# Patient Record
Sex: FEMALE | Race: WHITE | NOT HISPANIC OR LATINO | Employment: UNEMPLOYED | URBAN - METROPOLITAN AREA
[De-identification: names, ages, dates, MRNs, and addresses within clinical notes are randomized per-mention and may not be internally consistent; named-entity substitution may affect disease eponyms.]

---

## 2017-03-07 ENCOUNTER — HISTORICAL (OUTPATIENT)
Dept: ENDOSCOPY | Facility: HOSPITAL | Age: 47
End: 2017-03-07

## 2017-03-07 LAB — CRC RECOMMENDATION EXT: NORMAL

## 2018-01-04 ENCOUNTER — HISTORICAL (OUTPATIENT)
Dept: ADMINISTRATIVE | Facility: HOSPITAL | Age: 48
End: 2018-01-04

## 2018-01-04 LAB
ALBUMIN SERPL-MCNC: 4.2 GM/DL (ref 3.4–5)
ALBUMIN/GLOB SERPL: 1 RATIO (ref 1–2)
ALP SERPL-CCNC: 70 UNIT/L (ref 45–117)
ALT SERPL-CCNC: 26 UNIT/L (ref 12–78)
AST SERPL-CCNC: 17 UNIT/L (ref 15–37)
BILIRUB SERPL-MCNC: 0.4 MG/DL (ref 0.2–1)
BILIRUBIN DIRECT+TOT PNL SERPL-MCNC: <0.1 MG/DL
BILIRUBIN DIRECT+TOT PNL SERPL-MCNC: ABNORMAL MG/DL
BUN SERPL-MCNC: 8 MG/DL (ref 7–18)
CALCIUM SERPL-MCNC: 9.7 MG/DL (ref 8.5–10.1)
CHLORIDE SERPL-SCNC: 104 MMOL/L (ref 98–107)
CO2 SERPL-SCNC: 30 MMOL/L (ref 21–32)
CREAT SERPL-MCNC: 0.7 MG/DL (ref 0.6–1.3)
ERYTHROCYTE [DISTWIDTH] IN BLOOD BY AUTOMATED COUNT: 12.9 % (ref 11.5–14.5)
GLOBULIN SER-MCNC: 3.7 GM/ML (ref 2.3–3.5)
GLUCOSE SERPL-MCNC: 61 MG/DL (ref 74–106)
HCT VFR BLD AUTO: 44.6 % (ref 35–46)
HGB BLD-MCNC: 15 GM/DL (ref 12–16)
MCH RBC QN AUTO: 31.4 PG (ref 26–34)
MCHC RBC AUTO-ENTMCNC: 33.6 GM/DL (ref 31–37)
MCV RBC AUTO: 93.5 FL (ref 80–100)
PLATELET # BLD AUTO: 434 X10(3)/MCL (ref 130–400)
PMV BLD AUTO: 9.6 FL (ref 7.4–10.4)
POTASSIUM SERPL-SCNC: 3.9 MMOL/L (ref 3.5–5.1)
PROT SERPL-MCNC: 7.9 GM/DL (ref 6.4–8.2)
RBC # BLD AUTO: 4.77 X10(6)/MCL (ref 4–5.2)
SODIUM SERPL-SCNC: 142 MMOL/L (ref 136–145)
WBC # SPEC AUTO: 8.3 X10(3)/MCL (ref 4.5–11)

## 2018-01-25 ENCOUNTER — HISTORICAL (OUTPATIENT)
Dept: SURGERY | Facility: HOSPITAL | Age: 48
End: 2018-01-25

## 2018-08-17 ENCOUNTER — HISTORICAL (OUTPATIENT)
Dept: RADIOLOGY | Facility: HOSPITAL | Age: 48
End: 2018-08-17

## 2019-06-28 ENCOUNTER — HISTORICAL (OUTPATIENT)
Dept: ADMINISTRATIVE | Facility: HOSPITAL | Age: 49
End: 2019-06-28

## 2019-08-01 ENCOUNTER — HISTORICAL (OUTPATIENT)
Dept: ADMINISTRATIVE | Facility: HOSPITAL | Age: 49
End: 2019-08-01

## 2019-08-29 ENCOUNTER — HISTORICAL (OUTPATIENT)
Dept: ADMINISTRATIVE | Facility: HOSPITAL | Age: 49
End: 2019-08-29

## 2019-09-12 ENCOUNTER — HISTORICAL (OUTPATIENT)
Dept: ADMINISTRATIVE | Facility: HOSPITAL | Age: 49
End: 2019-09-12

## 2019-10-24 ENCOUNTER — HISTORICAL (OUTPATIENT)
Dept: ADMINISTRATIVE | Facility: HOSPITAL | Age: 49
End: 2019-10-24

## 2020-01-13 PROBLEM — M79.604 LOW BACK PAIN RADIATING TO RIGHT LEG: Status: ACTIVE | Noted: 2020-01-13

## 2020-01-13 PROBLEM — M51.369 DEGENERATIVE DISC DISEASE, LUMBAR: Status: ACTIVE | Noted: 2020-01-13

## 2020-01-13 PROBLEM — M50.30 DEGENERATIVE DISC DISEASE, CERVICAL: Status: ACTIVE | Noted: 2020-01-13

## 2020-01-13 PROBLEM — R20.2 NUMBNESS AND TINGLING: Status: ACTIVE | Noted: 2020-01-13

## 2020-01-13 PROBLEM — M51.36 DEGENERATIVE DISC DISEASE, LUMBAR: Status: ACTIVE | Noted: 2020-01-13

## 2020-01-13 PROBLEM — M54.50 LOW BACK PAIN RADIATING TO RIGHT LEG: Status: ACTIVE | Noted: 2020-01-13

## 2020-01-13 PROBLEM — M79.601 BILATERAL ARM PAIN: Status: ACTIVE | Noted: 2020-01-13

## 2020-01-13 PROBLEM — M54.2 NECK PAIN: Status: ACTIVE | Noted: 2020-01-13

## 2020-01-13 PROBLEM — M79.602 BILATERAL ARM PAIN: Status: ACTIVE | Noted: 2020-01-13

## 2020-01-13 PROBLEM — R20.0 NUMBNESS AND TINGLING: Status: ACTIVE | Noted: 2020-01-13

## 2020-02-14 ENCOUNTER — HISTORICAL (OUTPATIENT)
Dept: ADMINISTRATIVE | Facility: HOSPITAL | Age: 50
End: 2020-02-14

## 2020-04-30 LAB
BILIRUB SERPL-MCNC: NEGATIVE MG/DL
BLOOD URINE, POC: NORMAL
CLARITY, POC UA: CLEAR
COLOR, POC UA: NORMAL
GLUCOSE UR QL STRIP: NEGATIVE
KETONES UR QL STRIP: NEGATIVE
LEUKOCYTE EST, POC UA: NORMAL
NITRITE, POC UA: NEGATIVE
PH, POC UA: 7
PROTEIN, POC: NEGATIVE
SPECIFIC GRAVITY, POC UA: 1.01
UROBILINOGEN, POC UA: NORMAL

## 2020-07-28 LAB
HUMAN PAPILLOMAVIRUS (HPV): NORMAL
PAP RECOMMENDATION EXT: NORMAL
PAP SMEAR: NORMAL

## 2020-11-13 ENCOUNTER — HISTORICAL (OUTPATIENT)
Dept: ADMINISTRATIVE | Facility: HOSPITAL | Age: 50
End: 2020-11-13

## 2021-02-08 LAB
CHOLEST SERPL-MCNC: 211 MG/DL (ref 0–200)
DEPRECATED CALCIDIOL+CALCIFEROL SERPL-MC: 83.1 NG/ML (ref 30–100)
EST. AVERAGE GLUCOSE BLD GHB EST-MCNC: 99 MG/DL (ref 70–115)
HBA1C MFR BLD: 5.3 % (ref 4–6)
HDLC SERPL-MCNC: 44 MG/DL (ref 40–60)
LDLC SERPL CALC-MCNC: 124.6 MG/DL (ref 30–100)
TRIGL SERPL-MCNC: 276 MG/DL (ref 30–200)
TSH SERPL-ACNC: 4.36 UIU/ML (ref 0.36–3.74)

## 2021-03-12 ENCOUNTER — HISTORICAL (OUTPATIENT)
Dept: ADMINISTRATIVE | Facility: HOSPITAL | Age: 51
End: 2021-03-12

## 2021-03-16 ENCOUNTER — HISTORICAL (OUTPATIENT)
Dept: ADMINISTRATIVE | Facility: HOSPITAL | Age: 51
End: 2021-03-16

## 2021-03-23 ENCOUNTER — HISTORICAL (OUTPATIENT)
Dept: ADMINISTRATIVE | Facility: HOSPITAL | Age: 51
End: 2021-03-23

## 2021-04-15 ENCOUNTER — HISTORICAL (OUTPATIENT)
Dept: RADIOLOGY | Facility: HOSPITAL | Age: 51
End: 2021-04-15

## 2021-05-07 ENCOUNTER — HISTORICAL (OUTPATIENT)
Dept: ADMINISTRATIVE | Facility: HOSPITAL | Age: 51
End: 2021-05-07

## 2021-05-08 ENCOUNTER — HISTORICAL (OUTPATIENT)
Dept: ADMINISTRATIVE | Facility: HOSPITAL | Age: 51
End: 2021-05-08

## 2021-05-09 LAB
ALBUMIN SERPL-MCNC: 3.6 G/DL (ref 3.4–5)
ALBUMIN/GLOB SERPL: 1.6 {RATIO}
ALP SERPL-CCNC: 65 U/L (ref 50–144)
ALT SERPL-CCNC: 15 U/L (ref 1–45)
ANION GAP SERPL CALC-SCNC: 5 MMOL/L (ref 7–16)
AST SERPL-CCNC: 21 U/L (ref 14–36)
BILIRUB SERPL-MCNC: 0.37 MG/DL (ref 0.1–1)
BUN SERPL-MCNC: 9 MG/DL (ref 7–20)
CALCIUM SERPL-MCNC: 9.5 MG/DL (ref 8.4–10.2)
CHLORIDE SERPL-SCNC: 108 MMOL/L (ref 94–110)
CO2 SERPL-SCNC: 31 MMOL/L (ref 21–32)
CREAT SERPL-MCNC: 0.88 MG/DL (ref 0.52–1.04)
CREAT/UREA NIT SERPL: 10.2 (ref 12–20)
GLOBULIN SER-MCNC: 2.3 G/DL (ref 2–3.9)
GLUCOSE SERPL-MCNC: 89 MGM./DL (ref 70–115)
POTASSIUM SERPL-SCNC: 4.1 MMOL/L (ref 3.5–5.1)
PROT SERPL-MCNC: 5.9 G/DL (ref 6.3–8.2)
SODIUM SERPL-SCNC: 144 MMOL/L (ref 135–145)

## 2021-05-17 LAB
ALBUMIN SERPL-MCNC: 4 G/DL (ref 3.4–5)
ALBUMIN/GLOB SERPL: 1.7 {RATIO}
ALP SERPL-CCNC: 79 U/L (ref 50–144)
ALT SERPL-CCNC: 27 U/L (ref 1–45)
ANION GAP SERPL CALC-SCNC: 6 MMOL/L (ref 7–16)
AST SERPL-CCNC: 25 U/L (ref 14–36)
BILIRUB SERPL-MCNC: 0.45 MG/DL (ref 0.1–1)
BUN SERPL-MCNC: 8 MG/DL (ref 7–20)
CALCIUM SERPL-MCNC: 9.7 MG/DL (ref 8.4–10.2)
CHLORIDE SERPL-SCNC: 107 MMOL/L (ref 94–110)
CHOLEST SERPL-MCNC: 141 MG/DL (ref 0–200)
CO2 SERPL-SCNC: 27 MMOL/L (ref 21–32)
CREAT SERPL-MCNC: 0.8 MG/DL (ref 0.52–1.04)
CREAT/UREA NIT SERPL: 10 (ref 12–20)
GLOBULIN SER-MCNC: 2.3 G/DL (ref 2–3.9)
GLUCOSE SERPL-MCNC: 157 MGM./DL (ref 70–115)
HDLC SERPL-MCNC: 37 MG/DL (ref 40–60)
LDLC SERPL CALC-MCNC: 73.3 MG/DL (ref 30–100)
POTASSIUM SERPL-SCNC: 3.9 MMOL/L (ref 3.5–5.1)
PROT SERPL-MCNC: 6.3 G/DL (ref 6.3–8.2)
SODIUM SERPL-SCNC: 140 MMOL/L (ref 135–145)
TRIGL SERPL-MCNC: 120 MG/DL (ref 30–200)

## 2021-05-24 LAB
BASOPHILS # BLD AUTO: 0.04 X10(3)/MCL (ref 0.01–0.08)
BASOPHILS NFR BLD AUTO: 0.5 % (ref 0.1–1.2)
BILIRUB SERPL-MCNC: NEGATIVE MG/DL
BLOOD URINE, POC: NORMAL
CLARITY, POC UA: NORMAL
COLOR, POC UA: YELLOW
EOSINOPHIL # BLD AUTO: 0.1 X10(3)/MCL (ref 0.04–0.36)
EOSINOPHIL NFR BLD AUTO: 1.1 % (ref 0.7–7)
ERYTHROCYTE [DISTWIDTH] IN BLOOD BY AUTOMATED COUNT: 13.2 % (ref 11–14.5)
GLUCOSE UR QL STRIP: NEGATIVE
HCT VFR BLD AUTO: 40.7 % (ref 36–48)
HGB BLD-MCNC: 13.2 G/DL (ref 11.8–16)
IMM GRANULOCYTES # BLD AUTO: 0.02 X10E3/UL (ref 0–0.03)
IMM GRANULOCYTES NFR BLD AUTO: 0.2 % (ref 0–0.5)
KETONES UR QL STRIP: NEGATIVE
LEUKOCYTE EST, POC UA: NORMAL
LYMPHOCYTES # BLD AUTO: 3.17 X10(3)/MCL (ref 1.16–3.74)
LYMPHOCYTES NFR BLD AUTO: 36 % (ref 20–55)
MCH RBC QN AUTO: 30.6 PG (ref 27–34)
MCHC RBC AUTO-ENTMCNC: 32.4 G/DL (ref 31–37)
MCV RBC AUTO: 94.2 FL (ref 79–99)
MONOCYTES # BLD AUTO: 0.61 X10(3)/MCL (ref 0.24–0.36)
MONOCYTES NFR BLD AUTO: 6.9 % (ref 4.7–12.5)
NEUTROPHILS # BLD AUTO: 4.86 X10(3)/MCL (ref 1.56–6.13)
NEUTROPHILS NFR BLD AUTO: 55.3 % (ref 37–73)
NITRITE, POC UA: NEGATIVE
PH, POC UA: 7
PLATELET # BLD AUTO: 421 X10(3)/MCL (ref 140–371)
PMV BLD AUTO: 9.9 FL (ref 9.4–12.4)
PROTEIN, POC: NEGATIVE
RBC # BLD AUTO: 4.32 X10(6)/MCL (ref 4–5.1)
SPECIFIC GRAVITY, POC UA: 1.02
T3FREE SERPL-MCNC: 2.8 PG/ML (ref 2.7–5.2)
T4 FREE SERPL-MCNC: 1.36 NG/DL (ref 0.78–2.19)
TSH SERPL-ACNC: 2.31 UIU/ML (ref 0.36–3.74)
UROBILINOGEN, POC UA: NORMAL
WBC # SPEC AUTO: 8.8 X10(3)/MCL (ref 4–11.5)

## 2021-05-27 ENCOUNTER — HISTORICAL (OUTPATIENT)
Dept: ADMINISTRATIVE | Facility: HOSPITAL | Age: 51
End: 2021-05-27

## 2021-07-07 ENCOUNTER — HISTORICAL (OUTPATIENT)
Dept: ADMINISTRATIVE | Facility: HOSPITAL | Age: 51
End: 2021-07-07

## 2021-09-10 LAB
BASOPHILS # BLD AUTO: 0.07 X10(3)/MCL (ref 0.01–0.08)
BASOPHILS NFR BLD AUTO: 0.9 % (ref 0.1–1.2)
EOSINOPHIL # BLD AUTO: 0.12 X10(3)/MCL (ref 0.04–0.36)
EOSINOPHIL NFR BLD AUTO: 1.5 % (ref 0.7–7)
ERYTHROCYTE [DISTWIDTH] IN BLOOD BY AUTOMATED COUNT: 13.3 % (ref 11–14.5)
HCT VFR BLD AUTO: 39.3 % (ref 36–48)
HGB BLD-MCNC: 13.1 G/DL (ref 11.8–16)
IMM GRANULOCYTES # BLD AUTO: 0.04 X10E3/UL (ref 0–0.03)
IMM GRANULOCYTES NFR BLD AUTO: 0.5 % (ref 0–0.5)
LYMPHOCYTES # BLD AUTO: 2.59 X10(3)/MCL (ref 1.16–3.74)
LYMPHOCYTES NFR BLD AUTO: 33 % (ref 20–55)
MCH RBC QN AUTO: 30.5 PG (ref 27–34)
MCHC RBC AUTO-ENTMCNC: 33.3 G/DL (ref 31–37)
MCV RBC AUTO: 91.4 FL (ref 79–99)
MONOCYTES # BLD AUTO: 0.54 X10(3)/MCL (ref 0.24–0.36)
MONOCYTES NFR BLD AUTO: 6.9 % (ref 4.7–12.5)
NEUTROPHILS # BLD AUTO: 4.49 X10(3)/MCL (ref 1.56–6.13)
NEUTROPHILS NFR BLD AUTO: 57.2 % (ref 37–73)
PLATELET # BLD AUTO: 358 X10(3)/MCL (ref 140–371)
PMV BLD AUTO: 9.3 FL (ref 9.4–12.4)
RBC # BLD AUTO: 4.3 X10(6)/MCL (ref 4–5.1)
WBC # SPEC AUTO: 7.9 X10(3)/MCL (ref 4–11.5)

## 2022-01-04 LAB
INFLUENZA A ANTIGEN, POC: NEGATIVE
INFLUENZA B ANTIGEN, POC: NEGATIVE

## 2022-04-11 ENCOUNTER — HISTORICAL (OUTPATIENT)
Dept: ADMINISTRATIVE | Facility: HOSPITAL | Age: 52
End: 2022-04-11

## 2022-04-28 VITALS
SYSTOLIC BLOOD PRESSURE: 100 MMHG | HEIGHT: 64 IN | OXYGEN SATURATION: 95 % | DIASTOLIC BLOOD PRESSURE: 72 MMHG | BODY MASS INDEX: 50.02 KG/M2 | WEIGHT: 293 LBS

## 2022-04-30 NOTE — H&P
H&P:  Pt presents for removal of a epidermal inclusion cyst of the L brow with pathological analysis. H&P not significantly changed since the last visit as indiciated below.     Review of Systems   Constitutional symptoms:  No fever, no chills.    Skin symptoms:  No rash, no pruritus, no lesion.    Eye symptoms:  No recent vision problems,    ENMT symptoms:  Ear pain, tooth pain, no sore throat, no nasal congestion.    Respiratory symptoms:  No shortness of breath, no cough.    Cardiovascular symptoms:  No chest pain, no peripheral edema.    Gastrointestinal symptoms:  No abdominal pain, no nausea, no vomiting, no diarrhea.    Genitourinary symptoms:  No dysuria, no hematuria.    Musculoskeletal symptoms:  No Joint pain,    Neurologic symptoms:  No headache, no dizziness.    Psychiatric symptoms:  No anxiety,    Hematologic/Lymphatic symptoms:  Bleeding tendency negative,              Additional review of systems information: All other systems reviewed and otherwise negative, All systems reviewed as documented in chart.        Physicial Exam:   General:  Alert, no acute distress.    Skin:  Warm, dry, pink.    Head:  Normocephalic, atraumatic.    Neck:  Supple, trachea midline, no tenderness, no JVD.    Eye:  Pupils are equal, round and reactive to light, extraocular movements are intact, normal conjunctiva, vision unchanged, Eyelids: Left, upper, cyst.    Ears, nose, mouth and throat:  Oral mucosa moist, no pharyngeal erythema or exudate, Tympanic membrane: Bilateral, EVANGELINA, no erythema or drainage, Tooth: Left, upper, lower, molar, dental caries, fracture, no drainage visualized.    Cardiovascular:  Regular rate and rhythm, No murmur, Normal peripheral perfusion, No edema.    Respiratory:  Lungs are clear to auscultation, respirations are non-labored, breath sounds are equal, Symmetrical chest wall expansion.    Gastrointestinal:  Soft, Nontender, Non distended, Normal bowel sounds, No organomegaly.    Back:   Nontender, Normal range of motion, Normal alignment.    Musculoskeletal:  Normal ROM, normal strength, no tenderness, no swelling.    Neurological:  Alert and oriented to person, place, time, and situation, No focal neurological deficit observed, CN II-XII intact, normal sensory observed, normal motor observed, normal speech observed, normal coordination observed.    Lymphatics:  No lymphadenopathy.   Psychiatric:  Cooperative, appropriate mood & affect, normal judgment.        VAsc:   20/40 PH 20/30   20/50 PH 20/40    CVFF  EOMI    SLE  ELL: 1.7x1.0cm epidermal inclusion cyst OS sup-jeana orbit  CS: wq OU  K: clear OU  AC: dq OU  I: rr OU  L: clear OU  V; clear OU    A/P    1. Large epidermal inclusion cyst OS (sup-jeana orbit rim abutting nose)  - Pt wants it removed, it is enlarging, started with a small one which she popped but came back and cont to grow  - Discussed removal, pt agreeable to remove  - R/B/A/C discussed with patient including but not exclusive of possiblity that it may not be an inclusion cyst  - Will remove in OR and send for pathology    RTC POW#1

## 2022-04-30 NOTE — OP NOTE
Patient:   Samanta Chambers            MRN: [id number]            FIN: [id number]               Age:   47 years     Sex:  Female     :  1970   Associated Diagnoses:   None   Author:   Jesse Brunson MD         Patient: Samnata Chambers    Attending Surgeon: MD Mark    Assistant: CINDY Brunson MD    Pre-operative Diagnosis: L medial canthal / brow epidermal cyst    Post-operative Diagnosis: L medial canthal / brow epidermal cyst    Anesthesia: General    Procedure: Cyst removal.    Indication for Surgery: The patient was seen in the ophthalmology clinic complaining of increased mass above L eye with desire for removal for cosmetic / diagnostic purposes. Pt was informed about risks and benefits to procedure and all possible alternatives for therapy.     Proceure: The patient was taken into the operating room, placed in the supine position. General anesthetic was administered. The patient was prepped and draped in a usual manner. The procedure began by infiltrating lidocaine with epinephrine around the cyst area. Then, I proceeded with the help of a 15C blade to make an incision direclty over the mass. The incision was done superiorly then inferiorly to a full thickness and to the skin down to the cyst. The cyst was detached of the surrounding structure with the help of blunt dissection. Hemostasis was achieved with electrocautery. The mass was removed without rupture of the cyst capsule. The subcutanous tissues were closed with one 6-0 plain gut suture and then the wound was closed with 5 6-0 interupted nylon sutures. The wound was cleaned and maxitriol ointment was then put on the wound. The patient tolerated the procedure well without complications and transferred to recovery room in stable condition. I was present and participated in all aspects of the procedure. Sponge, needle, and instrument counts were completed at the end of the procedure.     The patient was then awoken from anesthesia  and wheeled to the PACU in stable condition.    Complications: None    Specimens: Sebcecous Cyst L brow / medial canthus    Condition: Stable    Dispotion: PACU then home

## 2022-05-08 ENCOUNTER — HISTORICAL (OUTPATIENT)
Dept: ADMINISTRATIVE | Facility: HOSPITAL | Age: 52
End: 2022-05-08

## 2022-05-29 ENCOUNTER — HISTORICAL (OUTPATIENT)
Dept: ADMINISTRATIVE | Facility: HOSPITAL | Age: 52
End: 2022-05-29

## 2022-09-21 ENCOUNTER — HISTORICAL (OUTPATIENT)
Dept: ADMINISTRATIVE | Facility: HOSPITAL | Age: 52
End: 2022-09-21
Payer: MEDICAID

## 2022-09-22 ENCOUNTER — HISTORICAL (OUTPATIENT)
Dept: ADMINISTRATIVE | Facility: HOSPITAL | Age: 52
End: 2022-09-22
Payer: MEDICAID

## 2023-01-23 RX ORDER — CITALOPRAM 40 MG/1
40 TABLET, FILM COATED ORAL DAILY
Qty: 30 TABLET | Refills: 0 | Status: SHIPPED | OUTPATIENT
Start: 2023-01-23 | End: 2023-01-24 | Stop reason: SDUPTHER

## 2023-01-23 RX ORDER — ATORVASTATIN CALCIUM 20 MG/1
20 TABLET, FILM COATED ORAL DAILY
COMMUNITY
End: 2023-01-23 | Stop reason: SDUPTHER

## 2023-01-23 RX ORDER — ATORVASTATIN CALCIUM 20 MG/1
20 TABLET, FILM COATED ORAL DAILY
Qty: 30 TABLET | Refills: 0 | Status: SHIPPED | OUTPATIENT
Start: 2023-01-23 | End: 2023-01-24 | Stop reason: SDUPTHER

## 2023-01-23 NOTE — TELEPHONE ENCOUNTER
----- Message from Moshe Thomas sent at 1/23/2023  9:10 AM CST -----  Regarding: REFILL  Type:  RX Refill Request    Who Called: pt  Refill or New Rx:refill  RX Name and Strength:citalopram (CELEXA) 40 MG tablet  How is the patient currently taking it? (ex. 1XDay):  Is this a 30 day or 90 day RX:  RX Name and Strength: atorvastatin 20mg  How is the patient currently taking it? (ex. 1XDay):  Is this a 30 day or 90 day RX:  RX Name and Strength:mirtazapine 15 mg  How is the patient currently taking it? (ex. 1XDay):  Is this a 30 day or 90 day RX:    Preferred Pharm[facility] with phone number[facility]   Local or Mail Order:  Ordering Provider:  Would the patient rather a call back or a response via [facility]?   Best Call Back Number:[phone]  Additional Information: pt need meds before next appt.

## 2023-01-24 NOTE — TELEPHONE ENCOUNTER
----- Message from Christie Pryor sent at 1/24/2023 11:44 AM CST -----  Regarding: refills      atorvastatin 20 mg oral tablet        citalopram 40 mg oral tablet          Wilfredo        [phone]

## 2023-01-25 RX ORDER — CITALOPRAM 40 MG/1
40 TABLET, FILM COATED ORAL DAILY
Qty: 30 TABLET | Refills: 0 | Status: SHIPPED | OUTPATIENT
Start: 2023-01-25 | End: 2023-02-03 | Stop reason: ALTCHOICE

## 2023-01-25 RX ORDER — ATORVASTATIN CALCIUM 20 MG/1
20 TABLET, FILM COATED ORAL DAILY
Qty: 30 TABLET | Refills: 0 | Status: SHIPPED | OUTPATIENT
Start: 2023-01-25 | End: 2023-02-09 | Stop reason: SDUPTHER

## 2023-01-27 ENCOUNTER — DOCUMENTATION ONLY (OUTPATIENT)
Dept: ADMINISTRATIVE | Facility: HOSPITAL | Age: 53
End: 2023-01-27
Payer: MEDICAID

## 2023-02-03 VITALS
BODY MASS INDEX: 68.21 KG/M2 | OXYGEN SATURATION: 98 % | DIASTOLIC BLOOD PRESSURE: 64 MMHG | SYSTOLIC BLOOD PRESSURE: 100 MMHG | HEART RATE: 100 BPM | HEIGHT: 64 IN | TEMPERATURE: 99 F

## 2023-02-03 RX ORDER — AMITRIPTYLINE HYDROCHLORIDE 25 MG/1
25 TABLET, FILM COATED ORAL NIGHTLY
COMMUNITY
Start: 2022-10-17 | End: 2023-02-06

## 2023-02-03 RX ORDER — MIRTAZAPINE 15 MG/1
15 TABLET, FILM COATED ORAL
COMMUNITY
Start: 2022-12-18 | End: 2023-02-27 | Stop reason: SDUPTHER

## 2023-02-03 RX ORDER — ALBUTEROL SULFATE 90 UG/1
AEROSOL, METERED RESPIRATORY (INHALATION)
COMMUNITY
Start: 2021-12-09 | End: 2023-02-06 | Stop reason: SDUPTHER

## 2023-02-03 RX ORDER — ERGOCALCIFEROL 1.25 MG/1
50000 CAPSULE ORAL
COMMUNITY
Start: 2023-01-17 | End: 2023-03-20 | Stop reason: SDUPTHER

## 2023-02-03 RX ORDER — DULOXETIN HYDROCHLORIDE 60 MG/1
60 CAPSULE, DELAYED RELEASE ORAL
COMMUNITY
Start: 2022-10-17 | End: 2023-03-27 | Stop reason: ALTCHOICE

## 2023-02-03 RX ORDER — CHOLECALCIFEROL (VITAMIN D3) 1250 MCG
TABLET ORAL
COMMUNITY
Start: 2021-12-15 | End: 2023-05-26 | Stop reason: SDUPTHER

## 2023-02-03 RX ORDER — OMEPRAZOLE 40 MG/1
40 CAPSULE, DELAYED RELEASE ORAL
COMMUNITY
Start: 2023-01-11 | End: 2023-05-01

## 2023-02-03 RX ORDER — METOPROLOL SUCCINATE 25 MG/1
25 TABLET, EXTENDED RELEASE ORAL
COMMUNITY
Start: 2023-01-23 | End: 2023-03-20

## 2023-02-03 RX ORDER — BUPROPION HYDROCHLORIDE 150 MG/1
TABLET, EXTENDED RELEASE ORAL
COMMUNITY
Start: 2022-09-01 | End: 2023-03-27 | Stop reason: SDUPTHER

## 2023-02-03 RX ORDER — TIZANIDINE 4 MG/1
4 TABLET ORAL
COMMUNITY
Start: 2023-01-11

## 2023-02-06 ENCOUNTER — OFFICE VISIT (OUTPATIENT)
Dept: FAMILY MEDICINE | Facility: CLINIC | Age: 53
End: 2023-02-06
Payer: MEDICAID

## 2023-02-06 VITALS
TEMPERATURE: 98 F | HEIGHT: 64 IN | SYSTOLIC BLOOD PRESSURE: 100 MMHG | OXYGEN SATURATION: 98 % | BODY MASS INDEX: 31.27 KG/M2 | HEART RATE: 86 BPM | WEIGHT: 183.19 LBS | DIASTOLIC BLOOD PRESSURE: 68 MMHG

## 2023-02-06 DIAGNOSIS — R51.9 CHRONIC NONINTRACTABLE HEADACHE, UNSPECIFIED HEADACHE TYPE: Primary | ICD-10-CM

## 2023-02-06 DIAGNOSIS — R06.2 WHEEZING: ICD-10-CM

## 2023-02-06 DIAGNOSIS — G89.29 CHRONIC NONINTRACTABLE HEADACHE, UNSPECIFIED HEADACHE TYPE: Primary | ICD-10-CM

## 2023-02-06 DIAGNOSIS — M53.9 MULTILEVEL DEGENERATIVE DISC DISEASE: ICD-10-CM

## 2023-02-06 PROCEDURE — 3008F PR BODY MASS INDEX (BMI) DOCUMENTED: ICD-10-PCS | Mod: CPTII,,, | Performed by: NURSE PRACTITIONER

## 2023-02-06 PROCEDURE — 1159F MED LIST DOCD IN RCRD: CPT | Mod: CPTII,,, | Performed by: NURSE PRACTITIONER

## 2023-02-06 PROCEDURE — 3008F BODY MASS INDEX DOCD: CPT | Mod: CPTII,,, | Performed by: NURSE PRACTITIONER

## 2023-02-06 PROCEDURE — 1159F PR MEDICATION LIST DOCUMENTED IN MEDICAL RECORD: ICD-10-PCS | Mod: CPTII,,, | Performed by: NURSE PRACTITIONER

## 2023-02-06 PROCEDURE — 3074F SYST BP LT 130 MM HG: CPT | Mod: CPTII,,, | Performed by: NURSE PRACTITIONER

## 2023-02-06 PROCEDURE — 99213 OFFICE O/P EST LOW 20 MIN: CPT | Mod: ,,, | Performed by: NURSE PRACTITIONER

## 2023-02-06 PROCEDURE — 3074F PR MOST RECENT SYSTOLIC BLOOD PRESSURE < 130 MM HG: ICD-10-PCS | Mod: CPTII,,, | Performed by: NURSE PRACTITIONER

## 2023-02-06 PROCEDURE — 3078F DIAST BP <80 MM HG: CPT | Mod: CPTII,,, | Performed by: NURSE PRACTITIONER

## 2023-02-06 PROCEDURE — 99213 PR OFFICE/OUTPT VISIT, EST, LEVL III, 20-29 MIN: ICD-10-PCS | Mod: ,,, | Performed by: NURSE PRACTITIONER

## 2023-02-06 PROCEDURE — 3078F PR MOST RECENT DIASTOLIC BLOOD PRESSURE < 80 MM HG: ICD-10-PCS | Mod: CPTII,,, | Performed by: NURSE PRACTITIONER

## 2023-02-06 RX ORDER — ALBUTEROL SULFATE 90 UG/1
2 AEROSOL, METERED RESPIRATORY (INHALATION) EVERY 6 HOURS PRN
Qty: 18 G | Refills: 0 | Status: SHIPPED | OUTPATIENT
Start: 2023-02-06

## 2023-02-06 RX ORDER — SUMATRIPTAN 50 MG/1
25 TABLET, FILM COATED ORAL
Qty: 20 TABLET | Refills: 0 | Status: SHIPPED | OUTPATIENT
Start: 2023-02-06 | End: 2024-03-19

## 2023-02-06 RX ORDER — PREGABALIN 150 MG/1
150 CAPSULE ORAL 3 TIMES DAILY
COMMUNITY
End: 2024-03-19

## 2023-02-06 NOTE — PROGRESS NOTES
"SUBJECTIVE:  Samanta Pastrana is a 52 y.o. female here for Headache and Cough      HPI  Presents to the clinic with c/o headaches on and off almost daily.  Has been happening for several weeks.  She has a PMH of degenerative disc diease at cervical, lumbar and thoracic levels.  She is following with  Dr Mcneill and has had several surgeries.  She has also had some wheezing with cough on and off and is out of her albuterol.   Griseldas allergies, medications, history, and problem list were updated as appropriate.    Review of Systems   Respiratory:  Positive for wheezing.    Neurological:  Positive for headaches.    A comprehensive review of symptoms was completed and negative except as noted above.    No results found for this or any previous visit (from the past 504 hour(s)).    OBJECTIVE:  Vital signs  Vitals:    02/06/23 1521   BP: 100/68   BP Location: Left arm   Patient Position: Sitting   BP Method: Medium (Manual)   Pulse: 86   Temp: 97.7 °F (36.5 °C)   TempSrc: Oral   SpO2: 98%   Weight: 83.1 kg (183 lb 3.2 oz)   Height: 5' 4" (1.626 m)        Physical Exam  Constitutional:       Appearance: Normal appearance.   HENT:      Head: Normocephalic and atraumatic.      Right Ear: Tympanic membrane, ear canal and external ear normal.      Left Ear: Tympanic membrane, ear canal and external ear normal.      Nose: Nose normal.      Mouth/Throat:      Mouth: Mucous membranes are moist.      Pharynx: Oropharynx is clear.   Eyes:      Extraocular Movements: Extraocular movements intact.      Conjunctiva/sclera: Conjunctivae normal.      Pupils: Pupils are equal, round, and reactive to light.   Cardiovascular:      Rate and Rhythm: Normal rate and regular rhythm.   Pulmonary:      Effort: Pulmonary effort is normal.      Breath sounds: Normal breath sounds.   Abdominal:      General: Bowel sounds are normal.      Palpations: Abdomen is soft.   Musculoskeletal:      Cervical back: Neck supple. Tenderness present. Pain with " movement and muscular tenderness present. Decreased range of motion.   Skin:     General: Skin is warm.      Capillary Refill: Capillary refill takes less than 2 seconds.   Neurological:      Mental Status: She is alert.   Psychiatric:         Mood and Affect: Mood normal.         Behavior: Behavior normal.         Judgment: Judgment normal.        ASSESSMENT/PLAN:  1. Chronic nonintractable headache, unspecified headache type  Comments:  headahce diary given and discussed use  Orders:  -     sumatriptan (IMITREX) 50 MG tablet; Take 0.5 tablets (25 mg total) by mouth every 2 (two) hours as needed for Migraine. Do not take more than 200 mg per day.  Dispense: 20 tablet; Refill: 0    2. Wheezing  Comments:  on and off - none today with exam    3. Multilevel degenerative disc disease  Comments:  being followed by neurosurgery, discussed as a likely cause of her headaches        Follow Up:  No follow-ups on file.

## 2023-02-09 ENCOUNTER — TELEPHONE (OUTPATIENT)
Dept: FAMILY MEDICINE | Facility: CLINIC | Age: 53
End: 2023-02-09
Payer: MEDICAID

## 2023-02-09 DIAGNOSIS — E78.5 HYPERLIPIDEMIA, UNSPECIFIED HYPERLIPIDEMIA TYPE: Primary | ICD-10-CM

## 2023-02-09 RX ORDER — ATORVASTATIN CALCIUM 20 MG/1
20 TABLET, FILM COATED ORAL DAILY
Qty: 30 TABLET | Refills: 0 | Status: SHIPPED | OUTPATIENT
Start: 2023-02-09 | End: 2023-02-27 | Stop reason: SDUPTHER

## 2023-02-23 ENCOUNTER — TELEPHONE (OUTPATIENT)
Dept: FAMILY MEDICINE | Facility: CLINIC | Age: 53
End: 2023-02-23
Payer: MEDICAID

## 2023-02-23 DIAGNOSIS — D22.9 MULTIPLE ATYPICAL SKIN MOLES: Primary | ICD-10-CM

## 2023-02-23 NOTE — TELEPHONE ENCOUNTER
Patient called and stated that she needs a referral sent to go see Ari Reyes since she has not seen him since 2019. Please advise.

## 2023-02-27 ENCOUNTER — TELEPHONE (OUTPATIENT)
Dept: FAMILY MEDICINE | Facility: CLINIC | Age: 53
End: 2023-02-27
Payer: MEDICAID

## 2023-02-27 DIAGNOSIS — F41.9 ANXIETY: Primary | ICD-10-CM

## 2023-02-27 DIAGNOSIS — E78.5 HYPERLIPIDEMIA, UNSPECIFIED HYPERLIPIDEMIA TYPE: ICD-10-CM

## 2023-02-27 DIAGNOSIS — G47.00 INSOMNIA, UNSPECIFIED TYPE: ICD-10-CM

## 2023-02-27 DIAGNOSIS — G47.00 INSOMNIA, UNSPECIFIED TYPE: Primary | ICD-10-CM

## 2023-02-27 RX ORDER — MIRTAZAPINE 15 MG/1
15 TABLET, FILM COATED ORAL NIGHTLY
Qty: 30 TABLET | Refills: 0 | Status: SHIPPED | OUTPATIENT
Start: 2023-02-27 | End: 2023-02-27 | Stop reason: SDUPTHER

## 2023-02-27 RX ORDER — CITALOPRAM 40 MG/1
40 TABLET, FILM COATED ORAL DAILY
COMMUNITY
End: 2023-02-27 | Stop reason: SDUPTHER

## 2023-02-27 RX ORDER — CITALOPRAM 40 MG/1
40 TABLET, FILM COATED ORAL DAILY
Qty: 30 TABLET | Refills: 0 | Status: SHIPPED | OUTPATIENT
Start: 2023-02-27 | End: 2023-03-31

## 2023-02-27 RX ORDER — ATORVASTATIN CALCIUM 20 MG/1
20 TABLET, FILM COATED ORAL DAILY
Qty: 30 TABLET | Refills: 0 | Status: SHIPPED | OUTPATIENT
Start: 2023-02-27 | End: 2023-02-27 | Stop reason: SDUPTHER

## 2023-02-27 RX ORDER — CITALOPRAM 40 MG/1
40 TABLET, FILM COATED ORAL DAILY
Qty: 30 TABLET | Refills: 11 | OUTPATIENT
Start: 2023-02-27 | End: 2024-02-27

## 2023-02-27 RX ORDER — ATORVASTATIN CALCIUM 20 MG/1
20 TABLET, FILM COATED ORAL DAILY
Qty: 30 TABLET | Refills: 0 | Status: SHIPPED | OUTPATIENT
Start: 2023-02-27 | End: 2023-03-31

## 2023-02-27 RX ORDER — MIRTAZAPINE 15 MG/1
15 TABLET, FILM COATED ORAL NIGHTLY
Qty: 30 TABLET | Refills: 0 | Status: SHIPPED | OUTPATIENT
Start: 2023-02-27 | End: 2023-03-27 | Stop reason: SDUPTHER

## 2023-03-20 ENCOUNTER — OFFICE VISIT (OUTPATIENT)
Dept: FAMILY MEDICINE | Facility: CLINIC | Age: 53
End: 2023-03-20
Payer: MEDICAID

## 2023-03-20 VITALS
TEMPERATURE: 97 F | DIASTOLIC BLOOD PRESSURE: 70 MMHG | BODY MASS INDEX: 31.17 KG/M2 | HEIGHT: 64 IN | HEART RATE: 80 BPM | SYSTOLIC BLOOD PRESSURE: 110 MMHG | OXYGEN SATURATION: 99 % | WEIGHT: 182.56 LBS

## 2023-03-20 DIAGNOSIS — J01.00 ACUTE MAXILLARY SINUSITIS, RECURRENCE NOT SPECIFIED: ICD-10-CM

## 2023-03-20 DIAGNOSIS — H10.33 ACUTE BACTERIAL CONJUNCTIVITIS OF BOTH EYES: Primary | ICD-10-CM

## 2023-03-20 PROCEDURE — 3008F BODY MASS INDEX DOCD: CPT | Mod: CPTII,,, | Performed by: NURSE PRACTITIONER

## 2023-03-20 PROCEDURE — 1160F PR REVIEW ALL MEDS BY PRESCRIBER/CLIN PHARMACIST DOCUMENTED: ICD-10-PCS | Mod: CPTII,,, | Performed by: NURSE PRACTITIONER

## 2023-03-20 PROCEDURE — 3074F SYST BP LT 130 MM HG: CPT | Mod: CPTII,,, | Performed by: NURSE PRACTITIONER

## 2023-03-20 PROCEDURE — 3074F PR MOST RECENT SYSTOLIC BLOOD PRESSURE < 130 MM HG: ICD-10-PCS | Mod: CPTII,,, | Performed by: NURSE PRACTITIONER

## 2023-03-20 PROCEDURE — 99213 OFFICE O/P EST LOW 20 MIN: CPT | Mod: ,,, | Performed by: NURSE PRACTITIONER

## 2023-03-20 PROCEDURE — 3078F DIAST BP <80 MM HG: CPT | Mod: CPTII,,, | Performed by: NURSE PRACTITIONER

## 2023-03-20 PROCEDURE — 1159F MED LIST DOCD IN RCRD: CPT | Mod: CPTII,,, | Performed by: NURSE PRACTITIONER

## 2023-03-20 PROCEDURE — 1159F PR MEDICATION LIST DOCUMENTED IN MEDICAL RECORD: ICD-10-PCS | Mod: CPTII,,, | Performed by: NURSE PRACTITIONER

## 2023-03-20 PROCEDURE — 99213 PR OFFICE/OUTPT VISIT, EST, LEVL III, 20-29 MIN: ICD-10-PCS | Mod: ,,, | Performed by: NURSE PRACTITIONER

## 2023-03-20 PROCEDURE — 3008F PR BODY MASS INDEX (BMI) DOCUMENTED: ICD-10-PCS | Mod: CPTII,,, | Performed by: NURSE PRACTITIONER

## 2023-03-20 PROCEDURE — 3078F PR MOST RECENT DIASTOLIC BLOOD PRESSURE < 80 MM HG: ICD-10-PCS | Mod: CPTII,,, | Performed by: NURSE PRACTITIONER

## 2023-03-20 PROCEDURE — 1160F RVW MEDS BY RX/DR IN RCRD: CPT | Mod: CPTII,,, | Performed by: NURSE PRACTITIONER

## 2023-03-20 RX ORDER — CEFDINIR 300 MG/1
300 CAPSULE ORAL 2 TIMES DAILY
Qty: 20 CAPSULE | Refills: 0 | Status: SHIPPED | OUTPATIENT
Start: 2023-03-20 | End: 2023-03-27

## 2023-03-20 RX ORDER — MOXIFLOXACIN 5 MG/ML
1 SOLUTION/ DROPS OPHTHALMIC 3 TIMES DAILY
Qty: 3 ML | Refills: 0 | Status: SHIPPED | OUTPATIENT
Start: 2023-03-20 | End: 2023-03-27

## 2023-03-20 NOTE — PROGRESS NOTES
Patient ID: Samanta Pastrana  : 1970     Chief Complaint: Conjunctivitis    Allergies: Patient has No Known Allergies.     History of Present Illness:  The patient is a 53 y.o. White female who presents to clinic for evaluation and management with a chief complaint of Conjunctivitis   Conjunctivitis  This is a new problem. The current episode started in the past 7 days. The problem has been gradually worsening. Associated symptoms include congestion, coughing, headaches and swollen glands. Pertinent negatives include no abdominal pain, anorexia, arthralgias, change in bowel habit, chest pain, chills, diaphoresis, fever, nausea, sore throat or urinary symptoms. Treatments tried: otc allergy eye drops. The treatment provided no relief (symptoms progressing despite otc allergy drops. now having to use wet compresses to open eyelids in am).   Sinus Problem  This is a new problem. The current episode started 1 to 4 weeks ago. The problem has been gradually worsening since onset. There has been no fever. The pain is moderate. Associated symptoms include congestion, coughing, headaches, a hoarse voice, sinus pressure, sneezing and swollen glands. Pertinent negatives include no chills, diaphoresis, ear pain, shortness of breath or sore throat. Past treatments include acetaminophen, saline nose sprays, oral decongestants and nasal decongestants. The treatment provided moderate relief.      Past Medical History:  has a past medical history of Allergies, Anxiety, Arthritis, Bilateral hand pain, Cancer, Carpal tunnel syndrome, Cervical neuropathy, Chronic pain, COVID-19, DDD (degenerative disc disease), cervical, DDD (degenerative disc disease), lumbar, Depression, DUB (dysfunctional uterine bleeding), Fibromyalgia, GERD (gastroesophageal reflux disease), History of lobectomy of thyroid, Hyperlipidemia, Insomnia, Joint pain, Mental disorder, Obesity, unspecified, Renal cyst, Right ureteral stone, Tobacco user, and  Vitamin D deficiency.    Social History:  reports that she has been smoking cigarettes. She has a 7.50 pack-year smoking history. She has been exposed to tobacco smoke. She has never used smokeless tobacco. She reports that she does not currently use alcohol. She reports that she does not use drugs.    Care Team: Patient Care Team:  DAQUAN Garcia as PCP - General (Family Medicine)  Chris Vázquez MD (Neurosurgery)  Rui Ortiz MD as Consulting Physician (Neurology)     Current Medications:  Current Outpatient Medications   Medication Instructions    albuterol (PROVENTIL/VENTOLIN HFA) 90 mcg/actuation inhaler 2 puffs, Inhalation, Every 6 hours PRN    atorvastatin (LIPITOR) 20 mg, Oral, Daily    buPROPion (WELLBUTRIN SR) 150 MG TBSR 12 hr tablet No dose, route, or frequency recorded.    cholecalciferol, vitamin D3, 1,250 mcg (50,000 unit) Tab   See Instructions, TAKE 1 CAPSULE BY MOUTH EVERY WEEK, # 5 cap(s), 6 Refill(s)    citalopram (CELEXA) 40 mg, Oral, Daily    DULoxetine (CYMBALTA) 60 mg, Oral    ergocalciferol (ERGOCALCIFEROL) 50,000 Units, Oral, Every 7 days    metoprolol succinate (TOPROL-XL) 25 mg, Oral    mirtazapine (REMERON) 15 mg, Oral, Nightly    naproxen (NAPROSYN) 500 mg, Oral, 2 times daily    omeprazole (PRILOSEC) 40 mg, Oral    pregabalin (LYRICA) 150 mg, Oral, 3 times daily    sumatriptan (IMITREX) 25 mg, Oral, Every 2 hours PRN, Do not take more than 200 mg per day.    tiZANidine (ZANAFLEX) 4 mg, Oral       Review of Systems   Constitutional:  Negative for chills, diaphoresis and fever.   HENT:  Positive for nasal congestion, hoarse voice, postnasal drip, rhinorrhea, sinus pressure/congestion and sneezing. Negative for ear pain and sore throat.    Eyes:  Positive for discharge, redness and itching.   Respiratory:  Positive for cough. Negative for shortness of breath.    Cardiovascular:  Negative for chest pain.   Gastrointestinal:  Negative for abdominal pain, anorexia, change in  "bowel habit, nausea and change in bowel habit.   Musculoskeletal:  Negative for arthralgias.   Integumentary:  Negative for mole/lesion.   Neurological:  Positive for headaches.      Visit Vitals  /70 (BP Location: Right arm, Patient Position: Sitting)   Pulse 80   Temp 97.2 °F (36.2 °C) (Temporal)   Ht 5' 4" (1.626 m)   Wt 82.8 kg (182 lb 8.7 oz)   LMP  (LMP Unknown)   SpO2 99%   BMI 31.33 kg/m²       Physical Exam  Vitals reviewed.   Constitutional:       General: She is not in acute distress.     Appearance: Normal appearance. She is ill-appearing.   HENT:      Head: Normocephalic and atraumatic.      Right Ear: Ear canal and external ear normal. A middle ear effusion is present.      Left Ear: Ear canal and external ear normal. A middle ear effusion is present.      Nose: No congestion.      Right Turbinates: Enlarged.      Left Turbinates: Enlarged.      Right Sinus: Maxillary sinus tenderness present.      Left Sinus: Maxillary sinus tenderness present.      Mouth/Throat:      Mouth: Mucous membranes are moist.      Pharynx: Oropharynx is clear. Posterior oropharyngeal erythema present. No oropharyngeal exudate.      Tonsils: No tonsillar exudate. 1+ on the right. 1+ on the left.   Eyes:      General: Lids are normal.         Right eye: Discharge (purulent) present.         Left eye: Discharge (purulent) present.     Extraocular Movements: Extraocular movements intact.      Conjunctiva/sclera:      Right eye: Right conjunctiva is injected. Exudate present.      Left eye: Left conjunctiva is injected. Chemosis and exudate present.      Pupils: Pupils are equal, round, and reactive to light.   Cardiovascular:      Rate and Rhythm: Normal rate and regular rhythm.      Pulses: Normal pulses.      Heart sounds: No murmur heard.  Pulmonary:      Effort: Pulmonary effort is normal.      Breath sounds: Normal breath sounds.   Musculoskeletal:         General: No swelling, tenderness or deformity. Normal range of " motion.   Skin:     General: Skin is warm and dry.      Capillary Refill: Capillary refill takes less than 2 seconds.      Coloration: Skin is not jaundiced.      Findings: No rash.   Neurological:      Mental Status: She is alert and oriented to person, place, and time.      Cranial Nerves: No cranial nerve deficit.   Psychiatric:         Mood and Affect: Mood normal.         Behavior: Behavior normal.          Assessment & Plan:  1. Acute bacterial conjunctivitis of both eyes  -     moxifloxacin (VIGAMOX) 0.5 % ophthalmic solution; Place 1 drop into both eyes 3 (three) times daily. for 7 days  Dispense: 3 mL; Refill: 0    2. Acute maxillary sinusitis, recurrence not specified  -     cefdinir (OMNICEF) 300 MG capsule; Take 1 capsule (300 mg total) by mouth 2 (two) times daily. for 10 days  Dispense: 20 capsule; Refill: 0         Future Appointments   Date Time Provider Department Center   3/27/2023  2:30 PM DAQUAN Garcia [facility]       Follow up if symptoms worsen or fail to improve, for Keep Apt as Scheduled. Call sooner if needed.    ARIELLE BARRIOS    Lab Frequency Next Occurrence   Ambulatory referral/consult to Dermatology Once 03/02/2023

## 2023-03-24 ENCOUNTER — HOSPITAL ENCOUNTER (EMERGENCY)
Facility: HOSPITAL | Age: 53
Discharge: HOME | End: 2023-03-24
Payer: MEDICAID

## 2023-03-24 VITALS
HEIGHT: 64 IN | OXYGEN SATURATION: 94 % | SYSTOLIC BLOOD PRESSURE: 126 MMHG | RESPIRATION RATE: 18 BRPM | WEIGHT: 182 LBS | DIASTOLIC BLOOD PRESSURE: 82 MMHG | HEART RATE: 75 BPM | TEMPERATURE: 98 F | BODY MASS INDEX: 31.07 KG/M2

## 2023-03-24 DIAGNOSIS — M54.9 ACUTE BILATERAL BACK PAIN, UNSPECIFIED BACK LOCATION: Primary | ICD-10-CM

## 2023-03-24 PROCEDURE — 99284 EMERGENCY DEPT VISIT MOD MDM: CPT | Mod: 25

## 2023-03-24 PROCEDURE — 96374 THER/PROPH/DIAG INJ IV PUSH: CPT

## 2023-03-24 PROCEDURE — 63600175 PHARM REV CODE 636 W HCPCS: Performed by: NURSE PRACTITIONER

## 2023-03-24 RX ORDER — HYDROMORPHONE HYDROCHLORIDE 2 MG/ML
2 INJECTION, SOLUTION INTRAMUSCULAR; INTRAVENOUS; SUBCUTANEOUS
Status: COMPLETED | OUTPATIENT
Start: 2023-03-24 | End: 2023-03-24

## 2023-03-24 RX ORDER — HYDROCODONE BITARTRATE AND ACETAMINOPHEN 10; 325 MG/1; MG/1
1 TABLET ORAL EVERY 6 HOURS PRN
Qty: 12 TABLET | Refills: 0 | Status: SHIPPED | OUTPATIENT
Start: 2023-03-24 | End: 2023-05-26

## 2023-03-24 RX ADMIN — HYDROMORPHONE HYDROCHLORIDE 2 MG: 2 INJECTION, SOLUTION INTRAMUSCULAR; INTRAVENOUS; SUBCUTANEOUS at 05:03

## 2023-03-24 NOTE — PROVIDER PROGRESS NOTES - EMERGENCY DEPT.
Upon discharge the patient statesEncounter Date: 3/24/2023    ED Physician Progress Notes        Physician Note:   Upon discharge the patient states her pain has improved greatly

## 2023-03-24 NOTE — ED PROVIDER NOTES
Encounter Date: 3/24/2023       History     Chief Complaint   Patient presents with    Back Pain     CHRONIC BACK AND NECK ISSUES. SLOWLY GETTING WORSE AND HAD A WORKUP DONE. [facility] CHRONIC RADICULOPATHY. CAT SCANS DONE HERE ALSO. GOT TO WHERE SHE COULDN'T WALK TODAY. LOW BACK INTO WAIST AREA DOWN TO LEGS AND FEET. TOOK PRESCRIBED MEDS BEFORE COMING. LYRICA 150MG, TIZANIDINE 4MG, SINUS AND EYE INFECTION MEDICATION, AND CYMBALTA TODAY     C/o chronic back pain that's worse today despite taking home emds    Review of patient's allergies indicates:  No Known Allergies  Past Medical History:   Diagnosis Date    Allergies     Anxiety     Arthritis     Bilateral hand pain     Cancer     Carpal tunnel syndrome     Cervical neuropathy     Chronic pain     COVID-19     DDD (degenerative disc disease), cervical     DDD (degenerative disc disease), lumbar     Depression     DUB (dysfunctional uterine bleeding)     Fibromyalgia     GERD (gastroesophageal reflux disease)     History of lobectomy of thyroid     Hyperlipidemia     Insomnia     Joint pain     Mental disorder     Obesity, unspecified     Renal cyst     Right ureteral stone     Tobacco user     Vitamin D deficiency      Past Surgical History:   Procedure Laterality Date    BREAST SURGERY  Dont remember    CERVICAL BIOPSY  W/ LOOP ELECTRODE EXCISION  2013 2010    CERVICAL FUSION      COLONOSCOPY  03/07/2017    Dr Sal Maynard    COLONOSCOPY  08/08/2019    excision of breast lump  2011    excision of face skin  01/25/2018    EXCISION OF LESION OF EYELID  01/25/2018    EYE SURGERY  2019    NECK SURGERY      TUBAL LIGATION       Family History   Problem Relation Age of Onset    Cancer Mother     Diabetes Mother     Heart disease Mother     Bipolar disorder Mother     COPD Mother     Heart failure Mother     Hypertension Mother     Mental illness Mother     Cancer Father     Cataracts Father     Graves' disease Sister     Hypertension Sister     Hyperthyroidism  Sister     Migraines Sister     Thyroid cancer Sister     COPD Sister     Bipolar disorder Brother     COPD Brother     Post-traumatic stress disorder Brother     Schizophrenia Brother     Heart disease Brother     No Known Problems Maternal Aunt     No Known Problems Maternal Uncle     No Known Problems Paternal Aunt     No Known Problems Paternal Uncle     No Known Problems Maternal Grandmother     No Known Problems Maternal Grandfather     No Known Problems Paternal Grandmother     No Known Problems Paternal Grandfather     Diabetes Sister     Hypertension Sister     Diabetes Brother     Drug abuse Brother     Mental illness Brother     Kidney disease Brother     Stroke Sister     Aneurysm Neg Hx     Clotting disorder Neg Hx     Dementia Neg Hx     Fainting Neg Hx     Hyperlipidemia Neg Hx     Liver disease Neg Hx     Neuropathy Neg Hx     Obesity Neg Hx     Parkinsonism Neg Hx     Seizures Neg Hx     Tremor Neg Hx      Social History     Tobacco Use    Smoking status: Every Day     Packs/day: 0.50     Years: 15.00     Pack years: 7.50     Types: Cigarettes     Passive exposure: Current    Smokeless tobacco: Never   Substance Use Topics    Alcohol use: Not Currently    Drug use: Never     Review of Systems   Musculoskeletal:  Positive for back pain.   All other systems reviewed and are negative.    Physical Exam     Initial Vitals   BP Pulse Resp Temp SpO2   03/24/23 1638 03/24/23 1638 03/24/23 1638 03/24/23 1634 03/24/23 1638   94/67 80 20 97.6 °F (36.4 °C) 97 %      MAP       --                Physical Exam    Nursing note and vitals reviewed.  Constitutional: She appears well-developed and well-nourished.   HENT:   Head: Normocephalic and atraumatic.   Eyes: Conjunctivae and EOM are normal. Pupils are equal, round, and reactive to light.   Neck: Neck supple.   Normal range of motion.  Cardiovascular:  Normal rate, regular rhythm and normal heart sounds.           Pulmonary/Chest: Breath sounds normal.    Musculoskeletal:         General: Normal range of motion.      Cervical back: Normal range of motion and neck supple.      Comments: Moderate low back pain with palpation and movement     Neurological: She is alert and oriented to person, place, and time. She has normal strength.   Skin: Skin is warm and dry. Capillary refill takes less than 2 seconds.   Psychiatric: She has a normal mood and affect. Her behavior is normal. Judgment and thought content normal.       ED Course   Procedures  Labs Reviewed - No data to display       Imaging Results    None          Medications   HYDROmorphone (PF) injection 2 mg (2 mg Intravenous Given 3/24/23 1709)                              Clinical Impression:   Final diagnoses:  [M54.9] Acute bilateral back pain, unspecified back location (Primary)        ED Disposition Condition    Discharge Stable          ED Prescriptions       Medication Sig Dispense Start Date End Date Auth. Provider    HYDROcodone-acetaminophen (NORCO)  mg per tablet Take 1 tablet by mouth every 6 (six) hours as needed for Pain. 12 tablet 3/24/2023 -- MARCIAL Reynaga          Follow-up Information       Follow up With Specialties Details Why Contact Info    [facility] Schedule an appointment as soon as possible for a visit   [address]  [address]  [facility]  [facility] [address]  [phone]               MARCIAL Reynaga  03/24/23 1393

## 2023-03-27 ENCOUNTER — OFFICE VISIT (OUTPATIENT)
Dept: FAMILY MEDICINE | Facility: CLINIC | Age: 53
End: 2023-03-27
Payer: MEDICAID

## 2023-03-27 VITALS
HEIGHT: 64 IN | HEART RATE: 76 BPM | OXYGEN SATURATION: 98 % | WEIGHT: 184.81 LBS | BODY MASS INDEX: 31.55 KG/M2 | DIASTOLIC BLOOD PRESSURE: 70 MMHG | TEMPERATURE: 96 F | SYSTOLIC BLOOD PRESSURE: 126 MMHG

## 2023-03-27 DIAGNOSIS — M51.36 DEGENERATIVE DISC DISEASE, LUMBAR: Primary | ICD-10-CM

## 2023-03-27 DIAGNOSIS — M54.9 DORSALGIA, UNSPECIFIED: ICD-10-CM

## 2023-03-27 DIAGNOSIS — G47.00 INSOMNIA, UNSPECIFIED TYPE: ICD-10-CM

## 2023-03-27 DIAGNOSIS — R32 URINARY INCONTINENCE, UNSPECIFIED TYPE: ICD-10-CM

## 2023-03-27 PROBLEM — G56.00 CARPAL TUNNEL SYNDROME: Status: ACTIVE | Noted: 2023-03-27

## 2023-03-27 PROBLEM — E66.9 OBESITY: Status: ACTIVE | Noted: 2023-03-27

## 2023-03-27 PROBLEM — N28.1 SIMPLE RENAL CYST: Status: ACTIVE | Noted: 2023-03-27

## 2023-03-27 PROBLEM — Z55.8 DEFICIENT KNOWLEDGE: Status: ACTIVE | Noted: 2023-03-27

## 2023-03-27 PROBLEM — U07.1 DISEASE DUE TO SEVERE ACUTE RESPIRATORY SYNDROME CORONAVIRUS 2 (SARS-COV-2): Status: ACTIVE | Noted: 2022-01-04

## 2023-03-27 PROBLEM — Z72.0 TOBACCO USER: Status: ACTIVE | Noted: 2023-03-27

## 2023-03-27 PROBLEM — E55.9 VITAMIN D DEFICIENCY: Status: ACTIVE | Noted: 2023-03-27

## 2023-03-27 PROBLEM — M25.50 ARTHRALGIA: Status: ACTIVE | Noted: 2023-03-27

## 2023-03-27 PROBLEM — E03.8 SUBCLINICAL HYPOTHYROIDISM: Status: ACTIVE | Noted: 2023-03-27

## 2023-03-27 PROBLEM — F32.1 MAJOR DEPRESSIVE DISORDER, SINGLE EPISODE, MODERATE: Status: ACTIVE | Noted: 2023-03-27

## 2023-03-27 PROBLEM — G54.2 NEUROPATHY, CERVICAL PLEXUS: Status: ACTIVE | Noted: 2023-03-27

## 2023-03-27 PROBLEM — M51.369 DEGENERATION OF INTERVERTEBRAL DISC OF LUMBAR REGION: Status: ACTIVE | Noted: 2023-03-27

## 2023-03-27 PROBLEM — F32.9 MAJOR DEPRESSIVE DISORDER: Status: ACTIVE | Noted: 2023-03-27

## 2023-03-27 PROBLEM — F41.9 ANXIETY: Status: ACTIVE | Noted: 2023-03-27

## 2023-03-27 PROBLEM — K21.9 GASTROESOPHAGEAL REFLUX DISEASE: Status: ACTIVE | Noted: 2023-03-27

## 2023-03-27 PROBLEM — M50.30 DEGENERATION OF INTERVERTEBRAL DISC OF CERVICAL REGION: Status: ACTIVE | Noted: 2023-03-27

## 2023-03-27 PROBLEM — M79.643 PAIN OF HAND: Status: ACTIVE | Noted: 2023-03-27

## 2023-03-27 PROBLEM — M79.7 FIBROMYALGIA: Status: ACTIVE | Noted: 2023-03-27

## 2023-03-27 PROCEDURE — 3008F PR BODY MASS INDEX (BMI) DOCUMENTED: ICD-10-PCS | Mod: CPTII,,, | Performed by: NURSE PRACTITIONER

## 2023-03-27 PROCEDURE — 3078F DIAST BP <80 MM HG: CPT | Mod: CPTII,,, | Performed by: NURSE PRACTITIONER

## 2023-03-27 PROCEDURE — 99214 OFFICE O/P EST MOD 30 MIN: CPT | Mod: ,,, | Performed by: NURSE PRACTITIONER

## 2023-03-27 PROCEDURE — 3078F PR MOST RECENT DIASTOLIC BLOOD PRESSURE < 80 MM HG: ICD-10-PCS | Mod: CPTII,,, | Performed by: NURSE PRACTITIONER

## 2023-03-27 PROCEDURE — 1160F RVW MEDS BY RX/DR IN RCRD: CPT | Mod: CPTII,,, | Performed by: NURSE PRACTITIONER

## 2023-03-27 PROCEDURE — 1159F MED LIST DOCD IN RCRD: CPT | Mod: CPTII,,, | Performed by: NURSE PRACTITIONER

## 2023-03-27 PROCEDURE — 1159F PR MEDICATION LIST DOCUMENTED IN MEDICAL RECORD: ICD-10-PCS | Mod: CPTII,,, | Performed by: NURSE PRACTITIONER

## 2023-03-27 PROCEDURE — 3074F PR MOST RECENT SYSTOLIC BLOOD PRESSURE < 130 MM HG: ICD-10-PCS | Mod: CPTII,,, | Performed by: NURSE PRACTITIONER

## 2023-03-27 PROCEDURE — 1160F PR REVIEW ALL MEDS BY PRESCRIBER/CLIN PHARMACIST DOCUMENTED: ICD-10-PCS | Mod: CPTII,,, | Performed by: NURSE PRACTITIONER

## 2023-03-27 PROCEDURE — 3074F SYST BP LT 130 MM HG: CPT | Mod: CPTII,,, | Performed by: NURSE PRACTITIONER

## 2023-03-27 PROCEDURE — 99214 PR OFFICE/OUTPT VISIT, EST, LEVL IV, 30-39 MIN: ICD-10-PCS | Mod: ,,, | Performed by: NURSE PRACTITIONER

## 2023-03-27 PROCEDURE — 3008F BODY MASS INDEX DOCD: CPT | Mod: CPTII,,, | Performed by: NURSE PRACTITIONER

## 2023-03-27 RX ORDER — MIRTAZAPINE 15 MG/1
7.5 TABLET, FILM COATED ORAL NIGHTLY
Qty: 15 TABLET | Refills: 5 | Status: SHIPPED | OUTPATIENT
Start: 2023-03-27 | End: 2023-05-26 | Stop reason: SDUPTHER

## 2023-03-27 RX ORDER — BUPROPION HYDROCHLORIDE 150 MG/1
150 TABLET, EXTENDED RELEASE ORAL DAILY
Qty: 30 TABLET | Refills: 5 | Status: SHIPPED | OUTPATIENT
Start: 2023-03-27 | End: 2023-05-26

## 2023-03-27 NOTE — PROGRESS NOTES
Patient ID: [id number]     Chief Complaint: wart removal (Wart removal, back pain)      HPI:     Samanta Pastrana is a 53 y.o. female here today for a but she is also complaining of worsening neck and low back pain.  She was seen in the emergency room 3 days ago.  No images were taken.  She was given a prescription for pain medication and an IM injection for pain. She reports being told that if the pain didn't improve, she'd need to go to ER in [place].  She states that the pain is not better.  She did not go to ER.  She is established with Neurosurgery.  She recently had an EMG BUE.  She has a follow up appointment in about 2 weeks.  She is also c/o pain in her left groin and numbness in the saddle area.  She has had episodes of incontinence of both bowel and bladder.  She has more weakness in her right leg than her left.  She has more pain in her left leg than her right.  She also has weakness in her right arm.      Past Medical History:  has a past medical history of Allergies, Anxiety, Arthritis, Bilateral hand pain, Cancer, Carpal tunnel syndrome, Cervical neuropathy, Chronic pain, COVID-19, DDD (degenerative disc disease), cervical, DDD (degenerative disc disease), lumbar, Depression, DUB (dysfunctional uterine bleeding), Fibromyalgia, GERD (gastroesophageal reflux disease), History of lobectomy of thyroid, Hyperlipidemia, Insomnia, Joint pain, Mental disorder, Obesity, unspecified, Renal cyst, Right ureteral stone, Tobacco user, and Vitamin D deficiency.    Surgical History:  has a past surgical history that includes Tubal ligation; Colonoscopy (03/07/2017); Cervical fusion; Colonoscopy (08/08/2019); Excision of lesion of eyelid (01/25/2018); excision of face skin (01/25/2018); Cervical biopsy w/ loop electrode excision (2013); excision of breast lump (2011); Eye surgery (2019); Breast surgery (Dont remember); and Neck surgery.    Family History: family history includes Bipolar disorder in her brother  and mother; COPD in her brother, mother, and sister; Cancer in her father and mother; Cataracts in her father; Diabetes in her brother, mother, and sister; Drug abuse in her brother; Graves' disease in her sister; Heart disease in her brother and mother; Heart failure in her mother; Hypertension in her mother, sister, and sister; Hyperthyroidism in her sister; Kidney disease in her brother; Mental illness in her brother and mother; Migraines in her sister; No Known Problems in her maternal aunt, maternal grandfather, maternal grandmother, maternal uncle, paternal aunt, paternal grandfather, paternal grandmother, and paternal uncle; Post-traumatic stress disorder in her brother; Schizophrenia in her brother; Stroke in her sister; Thyroid cancer in her sister.    Social History:  reports that she has been smoking cigarettes. She has a 7.50 pack-year smoking history. She has been exposed to tobacco smoke. She has never used smokeless tobacco. She reports that she does not currently use alcohol. She reports that she does not use drugs.    Current Outpatient Medications   Medication Instructions    albuterol (PROVENTIL/VENTOLIN HFA) 90 mcg/actuation inhaler 2 puffs, Inhalation, Every 6 hours PRN    atorvastatin (LIPITOR) 20 mg, Oral, Daily    buPROPion (WELLBUTRIN SR) 150 MG TBSR 12 hr tablet No dose, route, or frequency recorded.    cholecalciferol, vitamin D3, 1,250 mcg (50,000 unit) Tab   See Instructions, TAKE 1 CAPSULE BY MOUTH EVERY WEEK, # 5 cap(s), 6 Refill(s)    citalopram (CELEXA) 40 mg, Oral, Daily    DULoxetine (CYMBALTA) 60 mg, Oral    HYDROcodone-acetaminophen (NORCO)  mg per tablet 1 tablet, Oral, Every 6 hours PRN    metoprolol succinate (TOPROL-XL) 25 MG 24 hr tablet TAKE ONE TABLET BY MOUTH DAILY    mirtazapine (REMERON) 15 mg, Oral, Nightly    moxifloxacin (VIGAMOX) 0.5 % ophthalmic solution 1 drop, Both Eyes, 3 times daily    naproxen (NAPROSYN) 500 mg, Oral, 2 times daily     "omeprazole (PRILOSEC) 40 mg, Oral    pregabalin (LYRICA) 150 mg, Oral, 3 times daily    sumatriptan (IMITREX) 25 mg, Oral, Every 2 hours PRN, Do not take more than 200 mg per day.    tiZANidine (ZANAFLEX) 4 mg, Oral       Patient has No Known Allergies.     Patient Care Team:  DAQUAN Garcia as PCP - General (Family Medicine)  Chris Vázquez MD (Neurosurgery)  Rui Ortiz MD as Consulting Physician (Neurology)       Subjective:     Review of Systems    See HPI     Objective:     Visit Vitals  /70 (BP Location: Right arm)   Pulse 76   Temp 96.4 °F (35.8 °C) (Temporal)   Ht 5' 4" (1.626 m)   Wt 83.8 kg (184 lb 12.8 oz)   LMP  (LMP Unknown)   SpO2 98%   BMI 31.72 kg/m²       Physical Exam  Constitutional:       General: She is in acute distress (2* pain).   Cardiovascular:      Rate and Rhythm: Normal rate and regular rhythm.      Heart sounds: Normal heart sounds.   Pulmonary:      Effort: Pulmonary effort is normal.      Breath sounds: Normal breath sounds.   Musculoskeletal:      Cervical back: Rigidity and spasms present.      Thoracic back: Decreased range of motion.      Lumbar back: Spasms and tenderness present. Decreased range of motion.      Comments: BLE strength 3/5   Skin:     General: Skin is warm and dry.   Neurological:      Mental Status: She is alert.      Sensory: Sensory deficit present.      Motor: Weakness present.      Gait: Gait abnormal.      Deep Tendon Reflexes: Reflexes abnormal.      Reflex Scores:       Patellar reflexes are 3+ on the right side and 1+ on the left side.  Psychiatric:         Mood and Affect: Mood is anxious. Affect is tearful.         Speech: Speech normal.         Behavior: Behavior normal. Behavior is cooperative.       Assessment:       ICD-10-CM ICD-9-CM   1. Degenerative disc disease, lumbar  M51.36 722.52   2. Urinary incontinence, unspecified type  R32 788.30   3. Dorsalgia, unspecified  M54.9 724.5        Plan:       MRI lumbar spine to rule " out cauda equina.  She was encouraged to go to the emergency room in [place] if the MRI is not improved or if symptoms worsen before the test is able to be performed.  She was also encouraged to notify her neurosurgeon of all of her symptoms immediately.    Follow up if symptoms worsen or fail to improve. In addition to their scheduled follow up, the patient has also been instructed to follow up on as needed basis.     Future Appointments   Date Time Provider Department Center   3/28/2023  1:00 PM OA MRI1 450 LB LIMIT OA [facility]        Rozina Perez, BRITTANYP-C

## 2023-03-30 ENCOUNTER — TELEPHONE (OUTPATIENT)
Dept: FAMILY MEDICINE | Facility: CLINIC | Age: 53
End: 2023-03-30
Payer: MEDICAID

## 2023-03-30 RX ORDER — DIAZEPAM 5 MG/1
5 TABLET ORAL SEE ADMIN INSTRUCTIONS
COMMUNITY
End: 2023-05-26 | Stop reason: ALTCHOICE

## 2023-03-30 NOTE — TELEPHONE ENCOUNTER
MRI approved the approval # is [id number]. The [place] 32875 and it is approved from 03/27/2023 to 05/26/2023.

## 2023-03-30 NOTE — TELEPHONE ENCOUNTER
Called and spoke with patient and explained to her that Rozina will send in Valium 5 mg to help her relax for her MRI and that she can repeat in 30 minutes in necessary. Also explained to her that she cannot drive while taking this medication and she stated that her sister would be driving her to have her MRI done. Her MRI is scheduled for March 31,2023 at 10 am. Patient informed of this and that she had to be there for 0930 am. Patient verbalized understanding.

## 2023-03-31 ENCOUNTER — HOSPITAL ENCOUNTER (OUTPATIENT)
Dept: RADIOLOGY | Facility: HOSPITAL | Age: 53
Discharge: HOME | End: 2023-03-31
Attending: NURSE PRACTITIONER
Payer: MEDICAID

## 2023-03-31 DIAGNOSIS — E78.5 HYPERLIPIDEMIA, UNSPECIFIED HYPERLIPIDEMIA TYPE: ICD-10-CM

## 2023-03-31 DIAGNOSIS — M54.9 DORSALGIA, UNSPECIFIED: ICD-10-CM

## 2023-03-31 DIAGNOSIS — F41.9 ANXIETY: ICD-10-CM

## 2023-03-31 PROCEDURE — 72148 MRI LUMBAR SPINE W/O DYE: CPT | Mod: TC

## 2023-03-31 RX ORDER — CITALOPRAM 40 MG/1
TABLET, FILM COATED ORAL
Qty: 30 TABLET | Refills: 0 | Status: SHIPPED | OUTPATIENT
Start: 2023-03-31 | End: 2023-05-12

## 2023-03-31 RX ORDER — ATORVASTATIN CALCIUM 20 MG/1
TABLET, FILM COATED ORAL
Qty: 30 TABLET | Refills: 0 | Status: SHIPPED | OUTPATIENT
Start: 2023-03-31 | End: 2023-05-05

## 2023-04-04 ENCOUNTER — TELEPHONE (OUTPATIENT)
Dept: FAMILY MEDICINE | Facility: CLINIC | Age: 53
End: 2023-04-04
Payer: MEDICAID

## 2023-04-04 NOTE — TELEPHONE ENCOUNTER
Called and spoke with patient and she stated that it was time for mammogram so I speak with Rozina to get an order for this. JESUS Franco LPN              ----- Message from Judy Walton MA sent at 4/3/2023  1:20 PM CDT -----  Regarding: Mammogram Due  Our records indicate the patient is due for a mammogram. Please find out if the patient has had one done in the last year. If not, please get it ordered and set up for her.

## 2023-05-01 ENCOUNTER — TELEPHONE (OUTPATIENT)
Dept: FAMILY MEDICINE | Facility: CLINIC | Age: 53
End: 2023-05-01
Payer: MEDICAID

## 2023-05-01 DIAGNOSIS — K21.9 GASTROESOPHAGEAL REFLUX DISEASE, UNSPECIFIED WHETHER ESOPHAGITIS PRESENT: Primary | ICD-10-CM

## 2023-05-01 RX ORDER — OMEPRAZOLE 40 MG/1
CAPSULE, DELAYED RELEASE ORAL
Qty: 30 CAPSULE | Refills: 5 | Status: SHIPPED | OUTPATIENT
Start: 2023-05-01 | End: 2023-10-25

## 2023-05-01 NOTE — TELEPHONE ENCOUNTER
Patient called and stated that she cannot sleep and was wondering if her Remeron can be increased to 15 mg per night

## 2023-05-05 DIAGNOSIS — E78.5 HYPERLIPIDEMIA, UNSPECIFIED HYPERLIPIDEMIA TYPE: ICD-10-CM

## 2023-05-05 RX ORDER — ATORVASTATIN CALCIUM 20 MG/1
TABLET, FILM COATED ORAL
Qty: 30 TABLET | Refills: 0 | Status: SHIPPED | OUTPATIENT
Start: 2023-05-05 | End: 2023-06-13

## 2023-05-12 DIAGNOSIS — F41.9 ANXIETY: ICD-10-CM

## 2023-05-12 RX ORDER — CITALOPRAM 40 MG/1
TABLET, FILM COATED ORAL
Qty: 30 TABLET | Refills: 0 | Status: SHIPPED | OUTPATIENT
Start: 2023-05-12 | End: 2023-06-13

## 2023-05-15 DIAGNOSIS — E55.9 VITAMIN D DEFICIENCY: Primary | ICD-10-CM

## 2023-05-15 RX ORDER — ERGOCALCIFEROL 1.25 MG/1
CAPSULE ORAL
Qty: 5 CAPSULE | Refills: 6 | Status: SHIPPED | OUTPATIENT
Start: 2023-05-15 | End: 2024-01-29

## 2023-05-26 ENCOUNTER — OFFICE VISIT (OUTPATIENT)
Dept: FAMILY MEDICINE | Facility: CLINIC | Age: 53
End: 2023-05-26
Payer: MEDICAID

## 2023-05-26 VITALS
HEIGHT: 63 IN | TEMPERATURE: 97 F | HEART RATE: 67 BPM | BODY MASS INDEX: 31.64 KG/M2 | DIASTOLIC BLOOD PRESSURE: 66 MMHG | WEIGHT: 178.56 LBS | OXYGEN SATURATION: 99 % | SYSTOLIC BLOOD PRESSURE: 108 MMHG

## 2023-05-26 DIAGNOSIS — Z12.31 SCREENING MAMMOGRAM FOR BREAST CANCER: ICD-10-CM

## 2023-05-26 DIAGNOSIS — M79.7 FIBROMYALGIA: ICD-10-CM

## 2023-05-26 DIAGNOSIS — F41.9 ANXIETY: ICD-10-CM

## 2023-05-26 DIAGNOSIS — Z72.0 TOBACCO USER: ICD-10-CM

## 2023-05-26 DIAGNOSIS — M50.30 DEGENERATIVE DISC DISEASE, CERVICAL: ICD-10-CM

## 2023-05-26 DIAGNOSIS — G47.00 INSOMNIA, UNSPECIFIED TYPE: ICD-10-CM

## 2023-05-26 DIAGNOSIS — F32.1 MAJOR DEPRESSIVE DISORDER, SINGLE EPISODE, MODERATE: Primary | ICD-10-CM

## 2023-05-26 DIAGNOSIS — E03.8 SUBCLINICAL HYPOTHYROIDISM: ICD-10-CM

## 2023-05-26 DIAGNOSIS — E55.9 VITAMIN D DEFICIENCY: ICD-10-CM

## 2023-05-26 DIAGNOSIS — M51.36 DEGENERATIVE DISC DISEASE, LUMBAR: ICD-10-CM

## 2023-05-26 PROBLEM — U07.1 DISEASE DUE TO SEVERE ACUTE RESPIRATORY SYNDROME CORONAVIRUS 2 (SARS-COV-2): Status: RESOLVED | Noted: 2022-01-04 | Resolved: 2023-05-26

## 2023-05-26 PROBLEM — Z90.09 HISTORY OF LOBECTOMY OF THYROID: Status: ACTIVE | Noted: 2023-05-26

## 2023-05-26 PROBLEM — E78.5 HYPERLIPIDEMIA: Status: ACTIVE | Noted: 2023-05-26

## 2023-05-26 PROCEDURE — 99406 PR TOBACCO USE CESSATION INTERMEDIATE 3-10 MINUTES: ICD-10-PCS | Mod: ,,, | Performed by: NURSE PRACTITIONER

## 2023-05-26 PROCEDURE — 3008F PR BODY MASS INDEX (BMI) DOCUMENTED: ICD-10-PCS | Mod: CPTII,,, | Performed by: NURSE PRACTITIONER

## 2023-05-26 PROCEDURE — 1160F PR REVIEW ALL MEDS BY PRESCRIBER/CLIN PHARMACIST DOCUMENTED: ICD-10-PCS | Mod: CPTII,,, | Performed by: NURSE PRACTITIONER

## 2023-05-26 PROCEDURE — 99406 BEHAV CHNG SMOKING 3-10 MIN: CPT | Mod: ,,, | Performed by: NURSE PRACTITIONER

## 2023-05-26 PROCEDURE — 3074F PR MOST RECENT SYSTOLIC BLOOD PRESSURE < 130 MM HG: ICD-10-PCS | Mod: CPTII,,, | Performed by: NURSE PRACTITIONER

## 2023-05-26 PROCEDURE — 1159F PR MEDICATION LIST DOCUMENTED IN MEDICAL RECORD: ICD-10-PCS | Mod: CPTII,,, | Performed by: NURSE PRACTITIONER

## 2023-05-26 PROCEDURE — 3008F BODY MASS INDEX DOCD: CPT | Mod: CPTII,,, | Performed by: NURSE PRACTITIONER

## 2023-05-26 PROCEDURE — 99214 PR OFFICE/OUTPT VISIT, EST, LEVL IV, 30-39 MIN: ICD-10-PCS | Mod: 25,,, | Performed by: NURSE PRACTITIONER

## 2023-05-26 PROCEDURE — 3078F DIAST BP <80 MM HG: CPT | Mod: CPTII,,, | Performed by: NURSE PRACTITIONER

## 2023-05-26 PROCEDURE — 3074F SYST BP LT 130 MM HG: CPT | Mod: CPTII,,, | Performed by: NURSE PRACTITIONER

## 2023-05-26 PROCEDURE — 99214 OFFICE O/P EST MOD 30 MIN: CPT | Mod: 25,,, | Performed by: NURSE PRACTITIONER

## 2023-05-26 PROCEDURE — 1159F MED LIST DOCD IN RCRD: CPT | Mod: CPTII,,, | Performed by: NURSE PRACTITIONER

## 2023-05-26 PROCEDURE — 3078F PR MOST RECENT DIASTOLIC BLOOD PRESSURE < 80 MM HG: ICD-10-PCS | Mod: CPTII,,, | Performed by: NURSE PRACTITIONER

## 2023-05-26 PROCEDURE — 1160F RVW MEDS BY RX/DR IN RCRD: CPT | Mod: CPTII,,, | Performed by: NURSE PRACTITIONER

## 2023-05-26 RX ORDER — CARIPRAZINE 1.5 MG/1
1.5 CAPSULE, GELATIN COATED ORAL DAILY
Qty: 30 CAPSULE | Refills: 0 | Status: SHIPPED | OUTPATIENT
Start: 2023-05-26 | End: 2023-10-09

## 2023-05-26 RX ORDER — PANTOPRAZOLE SODIUM 40 MG/1
40 TABLET, DELAYED RELEASE ORAL DAILY
COMMUNITY
Start: 2023-04-12

## 2023-05-26 RX ORDER — MIRTAZAPINE 15 MG/1
15 TABLET, FILM COATED ORAL NIGHTLY
Qty: 90 TABLET | Refills: 3 | Status: SHIPPED | OUTPATIENT
Start: 2023-05-26

## 2023-05-26 RX ORDER — NABUMETONE 500 MG/1
500 TABLET, FILM COATED ORAL 2 TIMES DAILY
Qty: 180 TABLET | Refills: 1 | Status: SHIPPED | OUTPATIENT
Start: 2023-05-26 | End: 2024-03-19

## 2023-05-26 NOTE — PROGRESS NOTES
[facility]    [address] [address]    Phone:  [phone]       Thank You for choosing us for your health care visit. We are glad to serve you and happy to provide you with this summary of your visit. Please help us to ensure we have accurate records. If you find anything that needs to be changed, please let our staff know as soon as possible.          Your Demographic Information     Patient Name Sex     Morena Coffman Female 1989       Ethnic Group Patient Race    Not of  or  Origin White      Your Visit Details     Date & Time Provider Department    2017 11:00 AM Deborah Howell CNM [facility]      Your Upcoming Appointment*(Max 10)     2018 11:00 AM CST   Complete Physical Exam with Pap with Deborah Howell CNM   [facility] ([facility] [place])    [address] [place] [address] [phone]              We Ordered or Performed the Following     REMOVAL DRUG DELIVERY IMPLANT       Conditions Discussed Today or Order-Related Diagnoses        Comments    Encounter for gynecological examination without abnormal finding    -  Primary     Nexplanon removal           Your Vitals Were     BP Pulse Height Weight LMP BMI    110/70 ([place] [facility] [place]: [place]itting, Cuff Size: Large Adult) 80 5' (1.524 m) 168 lb 9.6 oz (76.5 kg) 2016 (Approximate) 32.93 kg/m2    Smoking Status                   Former Smoker           Medications Prescribed or Re-Ordered Today     levonorgestrel-ethinyl estradiol (AVIANE,ALESSE,LESSINA) 0.1-20 MG-MCG per tablet    Sig - Route: Take 1 tablet by mouth daily. - Oral    Class: Eprescribe    Pharmacy: [id number] - [place] - [address]  #: [phone]      Your Current Medications Are        Disp Refills Start End    levonorgestrel-ethinyl     Patient ID: [id number]     Chief Complaint: Follow-up (3-4 month checkup)      HPI:     Samanta Pastrana is a 53 y.o. female here today for a routine visit.  She has followed up with her neurosurgeon that her cervical fusion is complete.  He has given her the okay to start physical therapy.  He does believe she will need another surgery on her neck.  She questioned him about the MRI of her lumbar spine that was done 2 months ago, he did not recieve the report.  She is still having persistent depression despite taking Wellbutrin and Celexa.  She is requesting to see a psychiatrist.  She is also agreeable to see a counselor.  She will be provided with the names of local counselors.  She was also given the phone number for physical therapy.  She denies any thoughts of harm to herself but states that some days she can not even get out of bed.  She still has severe chronic pain.  She is due for a mammogram and agreeable to scheduling.    She notes that she is been unable to sleep since her Remeron was decreased to 7.5 mg and requesting to increase back to 15 mg.  She is also requesting to restart nabumetone.  She had stopped after her cervical fusion because the neurosurgeon said she could not take any anti-inflammatories until healed.    Past Medical History:  has a past medical history of Allergies, Anxiety, Arthritis, Bilateral hand pain, Cancer, Carpal tunnel syndrome, Cervical neuropathy, Chronic pain, COVID-19, DDD (degenerative disc disease), cervical, DDD (degenerative disc disease), lumbar, Depression, DUB (dysfunctional uterine bleeding), Fibromyalgia, GERD (gastroesophageal reflux disease), History of lobectomy of thyroid, Hyperlipidemia, Insomnia, Joint pain, Mental disorder, Obesity, unspecified, Renal cyst, Right ureteral stone, Tobacco user, and Vitamin D deficiency.    Surgical History:  has a past surgical history that includes Tubal ligation; Colonoscopy (03/07/2017); Cervical fusion; Colonoscopy  (08/08/2019); Excision of lesion of eyelid (01/25/2018); excision of face skin (01/25/2018); Cervical biopsy w/ loop electrode excision (2013); excision of breast lump (2011); Eye surgery (2019); Breast surgery (Dont remember); and Neck surgery.    Family History: family history includes Bipolar disorder in her brother and mother; COPD in her brother, mother, and sister; Cancer in her father and mother; Cataracts in her father; Diabetes in her brother, mother, and sister; Drug abuse in her brother; Graves' disease in her sister; Heart disease in her brother and mother; Heart failure in her mother; Hypertension in her mother, sister, and sister; Hyperthyroidism in her sister; Kidney disease in her brother; Mental illness in her brother and mother; Migraines in her sister; No Known Problems in her maternal aunt, maternal grandfather, maternal grandmother, maternal uncle, paternal aunt, paternal grandfather, paternal grandmother, and paternal uncle; Post-traumatic stress disorder in her brother; Schizophrenia in her brother; Stroke in her sister; Thyroid cancer in her sister.    Social History:  reports that she has been smoking cigarettes. She has a 7.50 pack-year smoking history. She has been exposed to tobacco smoke. She has never used smokeless tobacco. She reports that she does not currently use alcohol. She reports that she does not use drugs.    Current Outpatient Medications   Medication Instructions    albuterol (PROVENTIL/VENTOLIN HFA) 90 mcg/actuation inhaler 2 puffs, Inhalation, Every 6 hours PRN    atorvastatin (LIPITOR) 20 MG tablet TAKE 1 TABLET(20 MG) BY MOUTH EVERY DAY    citalopram (CELEXA) 40 MG tablet TAKE 1 TABLET BY MOUTH EVERY DAY    ergocalciferol (ERGOCALCIFEROL) 50,000 unit Cap TAKE ONE CAPSULE BY MOUTH ONCE A WEEK    metoprolol succinate (TOPROL-XL) 25 MG 24 hr tablet TAKE ONE TABLET BY MOUTH DAILY    mirtazapine (REMERON) 15 mg, Oral, Nightly    nabumetone (RELAFEN) 500 mg, Oral, 2  estradiol (AVIANE,ALESSE,LESSINA) 0.1-20 MG-MCG per tablet 84 tablet 4 1/13/2017     Sig - Route: Take 1 tablet by mouth daily. - Oral    Class: Eprescribe      Discontinued Medications        Reason for Discontinue    Prenatal Vit-Fe Fumarate-FA (PREPLUS) 27-1 MG Tab Patient Discharge    Prenatal-FeCbn-FeAspGl-FA-Omeg (ULTIMATECARE ONE) 27-1 MG Cap Patient Discharge      Allergies     No Known Allergies      Immunizations History as of 1/13/2017     Name Date    DTaP 10/26/1995, 1/3/1992, 1990, 1990, 1990    HIB 11/11/1991, 4/15/1991, 1/14/1991    HPV QUAD 2/1/2008, 5/4/2007, 3/5/2007    Hepatitis B Child 4/18/2002, 11/10/2000, 9/26/2000    Hib/hepatitis B 5/16/2001, 5/16/2001    INFLUENZA QUADRIVALENT 9/8/2015, 10/17/2014    Influenza 10/11/2010, 11/8/2007, 11/11/2005    MMR 10/26/1995, 11/11/1991    Pneumococcal Conjugate 7 Valent 5/16/2001    Polio, INACTIVATED 5/16/2001    Polio, ORAL 10/26/1995, 1/3/1992, 1990, 1990    Td:Adult type tetanus/diphtheria 5/16/2001    Tdap 8/11/2015 10:10 AM, 7/25/2013, 9/4/2007      Problem List as of 1/13/2017     Low grade squamous intraepithelial lesion on cytologic smear of cervix (lgsil)      Patient Portal Signup     Manage health care for you and your family anytime, anywhere with the new [facility], your free online resource for quick and easy access to personal health information, scheduling appointments, refilling prescriptions, viewing test results, paying bills and more.  Sign up for a free and secure account. Please follow the instructions below to securely complete your enrollment.     1. Go to [email or web address]  2. Click Sign Up Now   3. Enter the Activation Code when prompted     Activation Code: Activation code not generated  Current [facility] Status: Account disabled    If you have questions related to [facility], you can email [email or web address] or call [phone] to talk to our [facility] staff.  For questions related to your  "times daily    omeprazole (PRILOSEC) 40 MG capsule TAKE ONE CAPSULE BY MOUTH EVERY DAY    pantoprazole (PROTONIX) 40 mg, Oral, Daily    pregabalin (LYRICA) 150 mg, Oral, 3 times daily    sumatriptan (IMITREX) 25 mg, Oral, Every 2 hours PRN, Do not take more than 200 mg per day.    tiZANidine (ZANAFLEX) 4 mg, Oral    VRAYLAR 1.5 mg, Oral, Daily       Patient has No Known Allergies.     Patient Care Team:  DAQUAN Garcia as PCP - General (Family Medicine)  Chris Vázquez MD (Neurosurgery)  Rui Ortiz MD as Consulting Physician (Neurology)       Subjective:     Review of Systems    See HPI     Objective:     Visit Vitals  /66 (BP Location: Left arm, Patient Position: Sitting, BP Method: Large (Manual))   Pulse 67   Temp 96.8 °F (36 °C) (Temporal)   Ht 5' 2.99" (1.6 m)   Wt 81 kg (178 lb 9.2 oz)   LMP  (LMP Unknown)   SpO2 99%   BMI 31.64 kg/m²       Physical Exam  Constitutional:       General: She is not in acute distress.     Appearance: She is overweight.   Cardiovascular:      Rate and Rhythm: Normal rate and regular rhythm.      Heart sounds: Normal heart sounds.   Pulmonary:      Effort: Pulmonary effort is normal.      Breath sounds: Normal breath sounds.   Musculoskeletal:      Cervical back: Decreased range of motion.   Skin:     General: Skin is warm and dry.   Neurological:      Mental Status: She is alert.      Gait: Gait abnormal.   Psychiatric:         Attention and Perception: Attention normal.         Mood and Affect: Mood is depressed. Mood is not anxious.         Speech: Speech normal.         Behavior: Behavior normal. Behavior is cooperative.     Assessment:       ICD-10-CM ICD-9-CM   1. Major depressive disorder, single episode, moderate  F32.1 296.22   2. Insomnia, unspecified type  G47.00 780.52   3. Anxiety  F41.9 300.00   4. Subclinical hypothyroidism  E03.8 244.8   5. Fibromyalgia  M79.7 729.1   6. Degenerative disc disease, cervical  M50.30 722.4   7. Degenerative " health, contact your physician’s office.  Please remember to dial 911 for medical emergencies.              Patient Instructions     None       disc disease, lumbar  M51.36 722.52   8. Vitamin D deficiency  E55.9 268.9   9. Tobacco user  Z72.0 305.1   10. Screening mammogram for breast cancer  Z12.31 V76.12        Plan:     Restart nabumetone  Provided with phone number to schedule PT for neck.    Increase remeron to 15 mg  Stop Wellbutrin  Start Vraylar  Provided with phone numbers to schedule therapy with LPC  Referred to in office PMHNP for medication management    Assistance with smoking cessation was offered, including:  []  Medications  [x]  Counseling  []  Printed Information on Smoking Cessation  []  Referral to a Smoking Cessation Program    Patient was counseled regarding smoking for 3-10 minutes.     I spent a total of 31 minutes on the day of the visit.This includes face to face time and non-face to face time preparing to see the patient (eg, review of tests), obtaining and/or reviewing separately obtained history, documenting clinical information in the electronic or other health record, independently interpreting results and communicating results to the patient/family/caregiver, or care coordinator.     Follow up in about 3 months (around 8/26/2023) for Wellness, Labs Prior. In addition to their scheduled follow up, the patient has also been instructed to follow up on as needed basis.     Future Appointments   Date Time Provider Department Center   8/16/2023  9:30 AM LAB, [facility] MEIR Garcia   8/22/2023  2:00 PM DAQUAN Garcia FNP-C

## 2023-05-30 ENCOUNTER — PATIENT MESSAGE (OUTPATIENT)
Dept: ADMINISTRATIVE | Facility: HOSPITAL | Age: 53
End: 2023-05-30
Payer: MEDICAID

## 2023-06-13 DIAGNOSIS — E78.5 HYPERLIPIDEMIA, UNSPECIFIED HYPERLIPIDEMIA TYPE: ICD-10-CM

## 2023-06-13 DIAGNOSIS — F41.9 ANXIETY: ICD-10-CM

## 2023-06-13 RX ORDER — CITALOPRAM 40 MG/1
TABLET, FILM COATED ORAL
Qty: 30 TABLET | Refills: 0 | Status: SHIPPED | OUTPATIENT
Start: 2023-06-13 | End: 2023-06-28

## 2023-06-13 RX ORDER — ATORVASTATIN CALCIUM 20 MG/1
TABLET, FILM COATED ORAL
Qty: 30 TABLET | Refills: 0 | Status: SHIPPED | OUTPATIENT
Start: 2023-06-13 | End: 2023-07-17

## 2023-06-21 DIAGNOSIS — I10 HYPERTENSION, UNSPECIFIED TYPE: ICD-10-CM

## 2023-06-21 RX ORDER — METOPROLOL SUCCINATE 25 MG/1
TABLET, EXTENDED RELEASE ORAL
Qty: 30 TABLET | Refills: 2 | Status: SHIPPED | OUTPATIENT
Start: 2023-06-21 | End: 2023-09-12

## 2023-06-28 DIAGNOSIS — F41.9 ANXIETY: ICD-10-CM

## 2023-06-28 RX ORDER — CITALOPRAM 40 MG/1
TABLET, FILM COATED ORAL
Qty: 30 TABLET | Refills: 0 | Status: SHIPPED | OUTPATIENT
Start: 2023-06-28 | End: 2023-07-14 | Stop reason: SDUPTHER

## 2023-07-14 ENCOUNTER — OFFICE VISIT (OUTPATIENT)
Dept: FAMILY MEDICINE | Facility: CLINIC | Age: 53
End: 2023-07-14
Payer: MEDICAID

## 2023-07-14 VITALS
TEMPERATURE: 98 F | BODY MASS INDEX: 31.95 KG/M2 | DIASTOLIC BLOOD PRESSURE: 68 MMHG | WEIGHT: 180.31 LBS | HEIGHT: 63 IN | OXYGEN SATURATION: 98 % | SYSTOLIC BLOOD PRESSURE: 100 MMHG | HEART RATE: 94 BPM

## 2023-07-14 DIAGNOSIS — H60.501 ACUTE OTITIS EXTERNA OF RIGHT EAR, UNSPECIFIED TYPE: ICD-10-CM

## 2023-07-14 DIAGNOSIS — J01.00 ACUTE NON-RECURRENT MAXILLARY SINUSITIS: Primary | ICD-10-CM

## 2023-07-14 DIAGNOSIS — F41.9 ANXIETY: ICD-10-CM

## 2023-07-14 PROCEDURE — 3008F BODY MASS INDEX DOCD: CPT | Mod: CPTII,,, | Performed by: NURSE PRACTITIONER

## 2023-07-14 PROCEDURE — 3078F PR MOST RECENT DIASTOLIC BLOOD PRESSURE < 80 MM HG: ICD-10-PCS | Mod: CPTII,,, | Performed by: NURSE PRACTITIONER

## 2023-07-14 PROCEDURE — 3078F DIAST BP <80 MM HG: CPT | Mod: CPTII,,, | Performed by: NURSE PRACTITIONER

## 2023-07-14 PROCEDURE — 99214 PR OFFICE/OUTPT VISIT, EST, LEVL IV, 30-39 MIN: ICD-10-PCS | Mod: ,,, | Performed by: NURSE PRACTITIONER

## 2023-07-14 PROCEDURE — 99214 OFFICE O/P EST MOD 30 MIN: CPT | Mod: ,,, | Performed by: NURSE PRACTITIONER

## 2023-07-14 PROCEDURE — 3008F PR BODY MASS INDEX (BMI) DOCUMENTED: ICD-10-PCS | Mod: CPTII,,, | Performed by: NURSE PRACTITIONER

## 2023-07-14 PROCEDURE — 1160F RVW MEDS BY RX/DR IN RCRD: CPT | Mod: CPTII,,, | Performed by: NURSE PRACTITIONER

## 2023-07-14 PROCEDURE — 1160F PR REVIEW ALL MEDS BY PRESCRIBER/CLIN PHARMACIST DOCUMENTED: ICD-10-PCS | Mod: CPTII,,, | Performed by: NURSE PRACTITIONER

## 2023-07-14 PROCEDURE — 1159F MED LIST DOCD IN RCRD: CPT | Mod: CPTII,,, | Performed by: NURSE PRACTITIONER

## 2023-07-14 PROCEDURE — 3074F SYST BP LT 130 MM HG: CPT | Mod: CPTII,,, | Performed by: NURSE PRACTITIONER

## 2023-07-14 PROCEDURE — 1159F PR MEDICATION LIST DOCUMENTED IN MEDICAL RECORD: ICD-10-PCS | Mod: CPTII,,, | Performed by: NURSE PRACTITIONER

## 2023-07-14 PROCEDURE — 3074F PR MOST RECENT SYSTOLIC BLOOD PRESSURE < 130 MM HG: ICD-10-PCS | Mod: CPTII,,, | Performed by: NURSE PRACTITIONER

## 2023-07-14 RX ORDER — CITALOPRAM 40 MG/1
40 TABLET, FILM COATED ORAL DAILY
Qty: 30 TABLET | Refills: 0 | Status: SHIPPED | OUTPATIENT
Start: 2023-07-14 | End: 2023-08-22 | Stop reason: SDUPTHER

## 2023-07-14 RX ORDER — CEFDINIR 300 MG/1
300 CAPSULE ORAL 2 TIMES DAILY
Qty: 20 CAPSULE | Refills: 0 | Status: SHIPPED | OUTPATIENT
Start: 2023-07-14 | End: 2023-07-24

## 2023-07-14 RX ORDER — OFLOXACIN 3 MG/ML
5 SOLUTION AURICULAR (OTIC) DAILY
Qty: 5 ML | Refills: 0 | Status: SHIPPED | OUTPATIENT
Start: 2023-07-14 | End: 2023-07-17

## 2023-07-14 RX ORDER — TRIPROLIDINE/PSEUDOEPHEDRINE 2.5MG-60MG
30 TABLET ORAL EVERY 8 HOURS PRN
Qty: 473 ML | Refills: 0 | Status: SHIPPED | OUTPATIENT
Start: 2023-07-14 | End: 2023-10-09

## 2023-07-14 NOTE — PROGRESS NOTES
Patient ID: Samanta Pastrana  : 1970     Chief Complaint: Otalgia    Allergies: Patient has No Known Allergies.     History of Present Illness:  The patient is a 53 y.o. White female  pateint of Rozina Contreras who presents to clinic for evaluation and management with a chief complaint of Otalgia   Began with sinus congestion 3 weeks ago, last week noticed right ear feeling stopped up with some ringing in ear. Bloody drainage from ear x 1. Reports muffled hearing, right cheek pain.   Requesting refill of celexa, reports depression symptoms stable. Denies any adverse affects, SI/HI.     Otalgia   There is pain in the right ear. This is a new problem. The current episode started in the past 7 days (3 weeks). The problem has been gradually worsening. There has been no fever. The pain is moderate. Associated symptoms include coughing, ear discharge, headaches, hearing loss, rhinorrhea and a sore throat. Pertinent negatives include no abdominal pain, diarrhea, neck pain, rash or vomiting.      Past Medical History:  has a past medical history of Allergies, Anxiety, Arthritis, Bilateral hand pain, Cancer, Carpal tunnel syndrome, Cervical neuropathy, Chronic pain, COVID-19, DDD (degenerative disc disease), cervical, DDD (degenerative disc disease), lumbar, Depression, DUB (dysfunctional uterine bleeding), Fibromyalgia, GERD (gastroesophageal reflux disease), History of lobectomy of thyroid, Hyperlipidemia, Insomnia, Joint pain, Mental disorder, Obesity, unspecified, Renal cyst, Right ureteral stone, Tobacco user, and Vitamin D deficiency.    Social History:  reports that she has been smoking cigarettes. She has a 7.50 pack-year smoking history. She has been exposed to tobacco smoke. She has never used smokeless tobacco. She reports that she does not currently use alcohol. She reports that she does not use drugs.    Care Team: Patient Care Team:  DAQUAN Garcia as PCP - General (Family Medicine)  Chris WEST  "MD Kathie (Neurosurgery)  Rui Ortiz MD as Consulting Physician (Neurology)     Current Medications:  Current Outpatient Medications   Medication Instructions    albuterol (PROVENTIL/VENTOLIN HFA) 90 mcg/actuation inhaler 2 puffs, Inhalation, Every 6 hours PRN    atorvastatin (LIPITOR) 20 MG tablet TAKE 1 TABLET(20 MG) BY MOUTH EVERY DAY    cefdinir (OMNICEF) 300 mg, Oral, 2 times daily    citalopram (CELEXA) 40 mg, Oral, Daily    ergocalciferol (ERGOCALCIFEROL) 50,000 unit Cap TAKE ONE CAPSULE BY MOUTH ONCE A WEEK    ibuprofen 600 mg, Oral, Every 8 hours PRN    metoprolol succinate (TOPROL-XL) 25 MG 24 hr tablet TAKE 1 TABLET BY MOUTH DAILY    mirtazapine (REMERON) 15 mg, Oral, Nightly    nabumetone (RELAFEN) 500 mg, Oral, 2 times daily    ofloxacin (FLOXIN) 0.3 % otic solution 5 drops, Left Ear, Daily    omeprazole (PRILOSEC) 40 MG capsule TAKE ONE CAPSULE BY MOUTH EVERY DAY    pantoprazole (PROTONIX) 40 mg, Oral, Daily    pregabalin (LYRICA) 150 mg, Oral, 3 times daily    sumatriptan (IMITREX) 25 mg, Oral, Every 2 hours PRN, Do not take more than 200 mg per day.    tiZANidine (ZANAFLEX) 4 mg, Oral    VRAYLAR 1.5 mg, Oral, Daily       Review of Systems   HENT:  Positive for ear discharge, ear pain, hearing loss, rhinorrhea and sore throat.    Respiratory:  Positive for cough.    Gastrointestinal:  Negative for abdominal pain, diarrhea and vomiting.   Musculoskeletal:  Negative for neck pain.   Integumentary:  Negative for rash.   Neurological:  Positive for headaches.      Visit Vitals  /68 (BP Location: Right arm, Patient Position: Sitting)   Pulse 94   Temp 97.5 °F (36.4 °C) (Temporal)   Ht 5' 2.99" (1.6 m)   Wt 81.8 kg (180 lb 5.4 oz)   LMP  (LMP Unknown)   SpO2 98%   BMI 31.96 kg/m²       Physical Exam  Vitals reviewed.   Constitutional:       General: She is not in acute distress.     Appearance: Normal appearance.   HENT:      Head: Normocephalic and atraumatic.      Right Ear: Ear canal and " external ear normal. Swelling and tenderness present. No drainage. A middle ear effusion is present. Tympanic membrane is injected.      Left Ear: Tympanic membrane, ear canal and external ear normal.      Nose: Rhinorrhea present. No congestion. Rhinorrhea is purulent.      Right Turbinates: Enlarged and swollen.      Right Sinus: Maxillary sinus tenderness present.      Mouth/Throat:      Mouth: Mucous membranes are moist.      Pharynx: Oropharynx is clear. No oropharyngeal exudate or posterior oropharyngeal erythema.   Eyes:      Extraocular Movements: Extraocular movements intact.      Conjunctiva/sclera: Conjunctivae normal.      Pupils: Pupils are equal, round, and reactive to light.   Cardiovascular:      Rate and Rhythm: Normal rate and regular rhythm.      Pulses: Normal pulses.      Heart sounds: No murmur heard.  Pulmonary:      Effort: Pulmonary effort is normal.      Breath sounds: Normal breath sounds.   Musculoskeletal:         General: No swelling, tenderness or deformity.      Cervical back: Neck supple.   Lymphadenopathy:      Cervical: Cervical adenopathy present.   Skin:     General: Skin is warm and dry.      Capillary Refill: Capillary refill takes less than 2 seconds.      Coloration: Skin is not jaundiced.      Findings: No rash.   Neurological:      Mental Status: She is alert and oriented to person, place, and time.      Cranial Nerves: No cranial nerve deficit.   Psychiatric:         Mood and Affect: Mood normal.         Behavior: Behavior normal.        Labs Reviewed:  Chemistry:  Lab Results   Component Value Date     05/17/2021    K 3.9 05/17/2021    CHLORIDE 107 05/17/2021    BUN 8 05/17/2021    CREATININE 0.80 05/17/2021    GLUCOSE 157 (H) 05/17/2021    CALCIUM 9.7 05/17/2021    ALKPHOS 79 05/17/2021    LABPROT 6.3 05/17/2021    ALBUMIN 4.0 05/17/2021    BILIDIR 0.00 11/23/2019    IBILI 0.20 11/23/2019    AST 25 05/17/2021    ALT 27 05/17/2021    [id number] 83.10 02/08/2021     TSH 2.31 05/24/2021    [id number] 1.36 05/24/2021        Lab Results   Component Value Date    HGBA1C 5.3 02/08/2021        Hematology:  Lab Results   Component Value Date    WBC 7.9 09/10/2021    RBC 4.30 09/10/2021    HGB 13.1 09/10/2021    HCT 39.3 09/10/2021    MCV 91.4 09/10/2021    MCH 30.5 09/10/2021    MCHC 33.3 09/10/2021    RDW 13.3 09/10/2021     (H) 09/10/2021    MPV 9.3 (L) 09/10/2021       Lipid Panel:  Lab Results   Component Value Date    CHOL 141 05/17/2021    HDL 37 (L) 05/17/2021    TRIG 120 05/17/2021        Assessment & Plan:  1. Acute non-recurrent maxillary sinusitis  -     cefdinir (OMNICEF) 300 MG capsule; Take 1 capsule (300 mg total) by mouth 2 (two) times daily. for 10 days  Dispense: 20 capsule; Refill: 0    2. Anxiety  -     citalopram (CELEXA) 40 MG tablet; Take 1 tablet (40 mg total) by mouth once daily.  Dispense: 30 tablet; Refill: 0    3. Acute otitis externa of right ear, unspecified type  -     ofloxacin (FLOXIN) 0.3 % otic solution; Place 5 drops into the left ear once daily. for 3 days  Dispense: 5 mL; Refill: 0  -     ibuprofen 20 mg/mL oral liquid; Take 30 mLs (600 mg total) by mouth every 8 (eight) hours as needed for Temperature greater than (pain).  Dispense: 473 mL; Refill: 0         Future Appointments   Date Time Provider Department Center   7/18/2023  2:00 PM OA MAMMO1 OA [facility]   8/16/2023  9:30 AM LAB, [facility] LABORATORY DRAW STATION CHELITA RENETTA Garcia   8/22/2023  2:00 PM DAQUAN Garcia [facility] JOSE M Garcia       Follow up if symptoms worsen or fail to improve, for Keep Apt as Scheduled. Call sooner if needed.    ARIELLE BARRIOS    Lab Frequency Next Occurrence   Ambulatory referral/consult to Dermatology Once 03/02/2023   CBC Auto Differential Once 08/26/2023   Comprehensive Metabolic Panel Once 08/26/2023   Lipid Panel Once 08/26/2023   TSH Once 08/26/2023   Hemoglobin A1C Once 08/26/2023   Hepatitis C Antibody Once 08/26/2023    HIV 1/2 Ag/Ab (4th Gen) Once 08/26/2023   T4, Free Once 08/26/2023   Vitamin D Once 08/26/2023   Mammo Digital Screening Bilat w/ Shay Once 05/26/2023   Ambulatory referral/consult to Psychiatry Once 06/02/2023

## 2023-07-16 DIAGNOSIS — E78.5 HYPERLIPIDEMIA, UNSPECIFIED HYPERLIPIDEMIA TYPE: ICD-10-CM

## 2023-07-17 RX ORDER — ATORVASTATIN CALCIUM 20 MG/1
TABLET, FILM COATED ORAL
Qty: 30 TABLET | Refills: 0 | Status: SHIPPED | OUTPATIENT
Start: 2023-07-17 | End: 2023-08-15

## 2023-08-15 DIAGNOSIS — E78.5 HYPERLIPIDEMIA, UNSPECIFIED HYPERLIPIDEMIA TYPE: ICD-10-CM

## 2023-08-15 RX ORDER — ATORVASTATIN CALCIUM 20 MG/1
TABLET, FILM COATED ORAL
Qty: 30 TABLET | Refills: 0 | Status: SHIPPED | OUTPATIENT
Start: 2023-08-15 | End: 2023-09-14

## 2023-08-16 PROCEDURE — 80061 LIPID PANEL: CPT | Performed by: NURSE PRACTITIONER

## 2023-08-16 PROCEDURE — 84439 ASSAY OF FREE THYROXINE: CPT | Performed by: NURSE PRACTITIONER

## 2023-08-16 PROCEDURE — 82306 VITAMIN D 25 HYDROXY: CPT | Performed by: NURSE PRACTITIONER

## 2023-08-16 PROCEDURE — 85025 COMPLETE CBC W/AUTO DIFF WBC: CPT | Performed by: NURSE PRACTITIONER

## 2023-08-16 PROCEDURE — 80053 COMPREHEN METABOLIC PANEL: CPT | Performed by: NURSE PRACTITIONER

## 2023-08-16 PROCEDURE — 83036 HEMOGLOBIN GLYCOSYLATED A1C: CPT | Performed by: NURSE PRACTITIONER

## 2023-08-16 PROCEDURE — 84443 ASSAY THYROID STIM HORMONE: CPT | Performed by: NURSE PRACTITIONER

## 2023-08-16 PROCEDURE — 87389 HIV-1 AG W/HIV-1&-2 AB AG IA: CPT | Performed by: NURSE PRACTITIONER

## 2023-08-16 PROCEDURE — 86803 HEPATITIS C AB TEST: CPT | Performed by: NURSE PRACTITIONER

## 2023-08-22 ENCOUNTER — TELEPHONE (OUTPATIENT)
Dept: FAMILY MEDICINE | Facility: CLINIC | Age: 53
End: 2023-08-22

## 2023-08-22 DIAGNOSIS — F41.9 ANXIETY: ICD-10-CM

## 2023-08-22 RX ORDER — CITALOPRAM 40 MG/1
40 TABLET, FILM COATED ORAL DAILY
Qty: 30 TABLET | Refills: 0 | Status: SHIPPED | OUTPATIENT
Start: 2023-08-22 | End: 2023-09-29

## 2023-09-12 DIAGNOSIS — I10 HYPERTENSION, UNSPECIFIED TYPE: ICD-10-CM

## 2023-09-12 RX ORDER — METOPROLOL SUCCINATE 25 MG/1
25 TABLET, EXTENDED RELEASE ORAL
Qty: 30 TABLET | Refills: 2 | Status: SHIPPED | OUTPATIENT
Start: 2023-09-12 | End: 2023-12-14

## 2023-09-14 DIAGNOSIS — E78.5 HYPERLIPIDEMIA, UNSPECIFIED HYPERLIPIDEMIA TYPE: ICD-10-CM

## 2023-09-14 RX ORDER — ATORVASTATIN CALCIUM 20 MG/1
TABLET, FILM COATED ORAL
Qty: 30 TABLET | Refills: 0 | Status: SHIPPED | OUTPATIENT
Start: 2023-09-14 | End: 2023-10-16

## 2023-09-20 ENCOUNTER — PATIENT MESSAGE (OUTPATIENT)
Dept: ADMINISTRATIVE | Facility: HOSPITAL | Age: 53
End: 2023-09-20
Payer: MEDICAID

## 2023-09-29 DIAGNOSIS — F41.9 ANXIETY: ICD-10-CM

## 2023-09-29 RX ORDER — CITALOPRAM 40 MG/1
40 TABLET, FILM COATED ORAL
Qty: 30 TABLET | Refills: 0 | Status: SHIPPED | OUTPATIENT
Start: 2023-09-29 | End: 2023-10-30

## 2023-10-09 ENCOUNTER — OFFICE VISIT (OUTPATIENT)
Dept: FAMILY MEDICINE | Facility: CLINIC | Age: 53
End: 2023-10-09
Payer: MEDICAID

## 2023-10-09 VITALS
DIASTOLIC BLOOD PRESSURE: 80 MMHG | TEMPERATURE: 98 F | HEART RATE: 80 BPM | OXYGEN SATURATION: 98 % | BODY MASS INDEX: 31.71 KG/M2 | HEIGHT: 63 IN | WEIGHT: 179 LBS | SYSTOLIC BLOOD PRESSURE: 116 MMHG

## 2023-10-09 DIAGNOSIS — Z11.3 SCREENING FOR STD (SEXUALLY TRANSMITTED DISEASE): ICD-10-CM

## 2023-10-09 DIAGNOSIS — Z00.01 ENCOUNTER FOR ROUTINE ADULT HEALTH EXAMINATION WITH ABNORMAL FINDINGS: Primary | ICD-10-CM

## 2023-10-09 DIAGNOSIS — Z11.59 NEED FOR HEPATITIS C SCREENING TEST: ICD-10-CM

## 2023-10-09 DIAGNOSIS — Z12.31 SCREENING MAMMOGRAM FOR BREAST CANCER: ICD-10-CM

## 2023-10-09 LAB
HBV SURFACE AG SERPL QL IA: NEGATIVE
HBV SURFACE AG SERPL QL IA: NORMAL

## 2023-10-09 PROCEDURE — 3079F DIAST BP 80-89 MM HG: CPT | Mod: CPTII,,, | Performed by: NURSE PRACTITIONER

## 2023-10-09 PROCEDURE — 1160F RVW MEDS BY RX/DR IN RCRD: CPT | Mod: CPTII,,, | Performed by: NURSE PRACTITIONER

## 2023-10-09 PROCEDURE — 99396 PR PREVENTIVE VISIT,EST,40-64: ICD-10-PCS | Mod: ,,, | Performed by: NURSE PRACTITIONER

## 2023-10-09 PROCEDURE — 86709 HEPATITIS A IGM ANTIBODY: CPT | Performed by: NURSE PRACTITIONER

## 2023-10-09 PROCEDURE — 99396 PREV VISIT EST AGE 40-64: CPT | Mod: ,,, | Performed by: NURSE PRACTITIONER

## 2023-10-09 PROCEDURE — 86803 HEPATITIS C AB TEST: CPT | Performed by: NURSE PRACTITIONER

## 2023-10-09 PROCEDURE — 87491 CHLMYD TRACH DNA AMP PROBE: CPT | Performed by: NURSE PRACTITIONER

## 2023-10-09 PROCEDURE — 86706 HEP B SURFACE ANTIBODY: CPT | Performed by: NURSE PRACTITIONER

## 2023-10-09 PROCEDURE — 87340 HEPATITIS B SURFACE AG IA: CPT | Performed by: NURSE PRACTITIONER

## 2023-10-09 PROCEDURE — 1159F PR MEDICATION LIST DOCUMENTED IN MEDICAL RECORD: ICD-10-PCS | Mod: CPTII,,, | Performed by: NURSE PRACTITIONER

## 2023-10-09 PROCEDURE — 1159F MED LIST DOCD IN RCRD: CPT | Mod: CPTII,,, | Performed by: NURSE PRACTITIONER

## 2023-10-09 PROCEDURE — 3008F PR BODY MASS INDEX (BMI) DOCUMENTED: ICD-10-PCS | Mod: CPTII,,, | Performed by: NURSE PRACTITIONER

## 2023-10-09 PROCEDURE — 3044F HG A1C LEVEL LT 7.0%: CPT | Mod: CPTII,,, | Performed by: NURSE PRACTITIONER

## 2023-10-09 PROCEDURE — 3074F SYST BP LT 130 MM HG: CPT | Mod: CPTII,,, | Performed by: NURSE PRACTITIONER

## 2023-10-09 PROCEDURE — 1160F PR REVIEW ALL MEDS BY PRESCRIBER/CLIN PHARMACIST DOCUMENTED: ICD-10-PCS | Mod: CPTII,,, | Performed by: NURSE PRACTITIONER

## 2023-10-09 PROCEDURE — 86780 TREPONEMA PALLIDUM: CPT | Performed by: NURSE PRACTITIONER

## 2023-10-09 PROCEDURE — 3074F PR MOST RECENT SYSTOLIC BLOOD PRESSURE < 130 MM HG: ICD-10-PCS | Mod: CPTII,,, | Performed by: NURSE PRACTITIONER

## 2023-10-09 PROCEDURE — 3044F PR MOST RECENT HEMOGLOBIN A1C LEVEL <7.0%: ICD-10-PCS | Mod: CPTII,,, | Performed by: NURSE PRACTITIONER

## 2023-10-09 PROCEDURE — 3079F PR MOST RECENT DIASTOLIC BLOOD PRESSURE 80-89 MM HG: ICD-10-PCS | Mod: CPTII,,, | Performed by: NURSE PRACTITIONER

## 2023-10-09 PROCEDURE — 3008F BODY MASS INDEX DOCD: CPT | Mod: CPTII,,, | Performed by: NURSE PRACTITIONER

## 2023-10-09 NOTE — PROGRESS NOTES
Patient ID: [id number]     Chief Complaint: Annual Exam        HPI:     Samanta Pastrana is a 53 y.o. female here today for an annual wellness visit. Reviewed and discussed lab results.       Believes she's had 4 kidney stones in the last 6 months. This has been a problem for her in the past.  She has seen blood in her urine though not having any problems right now.  She has a brother with a kidney disease that caused him to be on dialysis.      She's making soap and selling it.  This is giving her something to do and keeps her moving, not focusing on the pain in her neck.  She reports that neurosurgery is ready to do another procedure on her neck.  She's waiting until the pain gets a little worse.      Her significant other was unfaithful in the last 12 months.  She is not surprised about the positive hepatitis c results and desires more screening for STDs. She's had an unusual vaginal odor recently.  Denies abnormal discharge. Has not had mammogram, forgot to reschedule.  Willing and able to go now.     Past Medical History:  has a past medical history of Allergies, Anxiety, Arthritis, Bilateral hand pain, Cancer, Carpal tunnel syndrome, Cervical neuropathy, Chronic pain, COVID-19, DDD (degenerative disc disease), cervical, DDD (degenerative disc disease), lumbar, Depression, DUB (dysfunctional uterine bleeding), Fibromyalgia, GERD (gastroesophageal reflux disease), History of lobectomy of thyroid, Hyperlipidemia, Insomnia, Joint pain, Mental disorder, Obesity, unspecified, Renal cyst, Right ureteral stone, Tobacco user, and Vitamin D deficiency.    Procedure History:  has a past surgical history that includes Tubal ligation; Colonoscopy (03/07/2017); Cervical fusion; Colonoscopy (08/08/2019); Excision of lesion of eyelid (01/25/2018); excision of face skin (01/25/2018); Cervical biopsy w/ loop electrode excision (2013); excision of breast lump (2011); Eye surgery (2019); Breast surgery (Dont remember); and Neck  surgery.    Family History: family history includes Bipolar disorder in her brother and mother; COPD in her brother, mother, and sister; Cancer in her father and mother; Cataracts in her father; Diabetes in her brother, mother, and sister; Drug abuse in her brother; Graves' disease in her sister; Heart disease in her brother and mother; Heart failure in her mother; Hypertension in her mother, sister, and sister; Hyperthyroidism in her sister; Kidney disease in her brother; Mental illness in her brother and mother; Migraines in her sister; No Known Problems in her maternal aunt, maternal grandfather, maternal grandmother, maternal uncle, paternal aunt, paternal grandfather, paternal grandmother, and paternal uncle; Post-traumatic stress disorder in her brother; Schizophrenia in her brother; Stroke in her sister; Thyroid cancer in her sister.    Social History:  reports that she has been smoking cigarettes. She has a 7.5 pack-year smoking history. She has been exposed to tobacco smoke. She has never used smokeless tobacco. She reports that she does not currently use alcohol. She reports that she does not use drugs.    Current Outpatient Medications   Medication Instructions    albuterol (PROVENTIL/VENTOLIN HFA) 90 mcg/actuation inhaler 2 puffs, Inhalation, Every 6 hours PRN    atorvastatin (LIPITOR) 20 MG tablet TAKE 1 TABLET(20 MG) BY MOUTH EVERY DAY    citalopram (CELEXA) 40 mg, Oral    ergocalciferol (ERGOCALCIFEROL) 50,000 unit Cap TAKE ONE CAPSULE BY MOUTH ONCE A WEEK    metoprolol succinate (TOPROL-XL) 25 mg, Oral    mirtazapine (REMERON) 15 mg, Oral, Nightly    nabumetone (RELAFEN) 500 mg, Oral, 2 times daily    omeprazole (PRILOSEC) 40 MG capsule TAKE ONE CAPSULE BY MOUTH EVERY DAY    pantoprazole (PROTONIX) 40 mg, Oral, Daily    pregabalin (LYRICA) 150 mg, Oral, 3 times daily    sumatriptan (IMITREX) 25 mg, Oral, Every 2 hours PRN, Do not take more than 200 mg per day.    tiZANidine (ZANAFLEX) 4  "mg, Oral       Patient has No Known Allergies.     Patient Care Team:  Rozina Perez FNP-C as PCP - General (Family Medicine)  Chris Vázquez MD (Neurosurgery)  Rui Ortiz MD as Consulting Physician (Neurology)      Subjective:     Review of Systems    12 point review of systems conducted, negative except as stated in the history of present illness. See HPI for details.      Objective:     Visit Vitals  /80 (BP Location: Left arm)   Pulse 80   Temp 98.1 °F (36.7 °C) (Temporal)   Ht 5' 2.99" (1.6 m)   Wt 81.2 kg (179 lb)   LMP  (LMP Unknown)   SpO2 98%   BMI 31.72 kg/m²       Physical Exam  Vitals reviewed.   Constitutional:       General: She is not in acute distress.     Appearance: Normal appearance.   HENT:      Head: Normocephalic and atraumatic.      Right Ear: Tympanic membrane, ear canal and external ear normal.      Left Ear: Tympanic membrane, ear canal and external ear normal.      Nose: No congestion.      Mouth/Throat:      Mouth: Mucous membranes are moist.      Pharynx: Oropharynx is clear. No oropharyngeal exudate or posterior oropharyngeal erythema.   Eyes:      Extraocular Movements: Extraocular movements intact.      Conjunctiva/sclera: Conjunctivae normal.      Pupils: Pupils are equal, round, and reactive to light.   Cardiovascular:      Rate and Rhythm: Normal rate and regular rhythm.      Pulses: Normal pulses.      Heart sounds: No murmur heard.  Pulmonary:      Effort: Pulmonary effort is normal.      Breath sounds: Normal breath sounds.   Musculoskeletal:         General: No swelling, tenderness or deformity.      Cervical back: Neck supple.   Lymphadenopathy:      Cervical: No cervical adenopathy.   Skin:     General: Skin is warm and dry.      Capillary Refill: Capillary refill takes less than 2 seconds.      Coloration: Skin is not jaundiced.      Findings: No rash.   Neurological:      Mental Status: She is alert and oriented to person, place, and time.      Cranial " Nerves: No cranial nerve deficit.   Psychiatric:         Mood and Affect: Mood normal.         Behavior: Behavior normal.         Labs Reviewed:     Chemistry:  Lab Results   Component Value Date     08/16/2023    K 4.4 08/16/2023    CHLORIDE 106 08/16/2023    BUN 9.0 08/16/2023    CREATININE 0.80 08/16/2023    EGFRNORACEVR 89 08/16/2023    GLUCOSE 114 08/16/2023    CALCIUM 9.5 08/16/2023    ALKPHOS 77 08/16/2023    LABPROT 6.1 (L) 08/16/2023    ALBUMIN 4.0 08/16/2023    BILIDIR 0.00 11/23/2019    IBILI 0.20 11/23/2019    AST 38 (H) 08/16/2023    ALT 29 08/16/2023    [id number] 48.5 08/16/2023    TSH 3.310 08/16/2023    [id number] 1.12 08/16/2023        Lab Results   Component Value Date    HGBA1C 5.5 08/16/2023        Hematology:  Lab Results   Component Value Date    WBC 7.93 08/16/2023    RBC 4.15 08/16/2023    HGB 12.8 08/16/2023    HCT 37.4 08/16/2023    MCV 90.1 08/16/2023    MCH 30.8 08/16/2023    MCHC 34.2 08/16/2023    RDW 13.8 08/16/2023     08/16/2023    MPV 9.8 08/16/2023       Lipid Panel:  Lab Results   Component Value Date    CHOL 139 08/16/2023    HDL 31 (L) 08/16/2023    DLDL 62.6 08/16/2023    TRIG 328 (H) 08/16/2023        Assessment:       ICD-10-CM ICD-9-CM   1. Encounter for routine adult health examination with abnormal findings  Z00.01 V70.0   2. Need for hepatitis C screening test  Z11.59 V73.89   3. Screening mammogram for breast cancer  Z12.31 V76.12   4. Screening for STD (sexually transmitted disease)  Z11.3 V74.5        Plan:     Hep C screen positive, sending for confirmatory testing and further testing due to exposure to STDs.     Cervical Cancer Screening -  Last Pap 7/28/2020, due 7/2025    Breast Cancer Screening - Ordered today    Colon Cancer Screening - Colonoscopy on 3/7/17, due 3/2027.     Osteoporosis Screening - deferred     Eye Exam - Recommend annually.    Dental Exam - Recommend biannual exams.     Vaccinations -   Immunization History   Administered  Date(s) Administered    COVID-19, MRNA, LN-S, PF (Pfizer) (Purple Cap) 09/07/2021, 10/08/2021    DTP [birth year]    IPV 1970, 1970, 01/26/1978, 07/05/1979    Influenza - Quadrivalent - PF *Preferred* (6 months and older) 11/03/2022    Measles / Rubella 05/25/1972    Mumps 05/08/1980      The patient's Health Maintenance was reviewed and the following appears to be due at this time:   Health Maintenance Due   Topic Date Due    Pneumococcal Vaccines (Age 0-64) (1 - PCV) Never done    TETANUS VACCINE  Never done    Shingles Vaccine (1 of 2) Never done    Mammogram  03/23/2022    Influenza Vaccine (1) 09/01/2023    COVID-19 Vaccine (3 - 2023-24 season) 09/01/2023         Follow up in about 6 months (around 4/9/2024) for Routine, non-fasting labs prior. In addition to their scheduled follow up, the patient has also been instructed to follow up on as needed basis.     Future Appointments   Date Time Provider Department Center   4/9/2024  8:00 AM Rozina Perez FNP-C [facility]   4/9/2024 10:00 AM LAB, [facility] LABORATORY DRAW STATION [facility] RENETTA Garcia

## 2023-10-10 LAB
C TRACH DNA SPEC QL NAA+PROBE: NOT DETECTED
HBV CORE IGM SERPL QL IA: NONREACTIVE
HBV SURFACE AB SER-ACNC: 0.37 MIU/ML
HBV SURFACE AB SERPL IA-ACNC: NONREACTIVE M[IU]/ML
N GONORRHOEA DNA SPEC QL NAA+PROBE: NOT DETECTED
SOURCE (OHS): NORMAL
T PALLIDUM AB SER QL: NONREACTIVE

## 2023-10-11 LAB — MAYO GENERIC ORDERABLE RESULT: NORMAL

## 2023-10-12 ENCOUNTER — TELEPHONE (OUTPATIENT)
Dept: FAMILY MEDICINE | Facility: CLINIC | Age: 53
End: 2023-10-12
Payer: MEDICAID

## 2023-10-14 DIAGNOSIS — E78.5 HYPERLIPIDEMIA, UNSPECIFIED HYPERLIPIDEMIA TYPE: ICD-10-CM

## 2023-10-16 ENCOUNTER — TELEPHONE (OUTPATIENT)
Dept: FAMILY MEDICINE | Facility: CLINIC | Age: 53
End: 2023-10-16
Payer: MEDICAID

## 2023-10-16 RX ORDER — ATORVASTATIN CALCIUM 20 MG/1
TABLET, FILM COATED ORAL
Qty: 30 TABLET | Refills: 0 | Status: SHIPPED | OUTPATIENT
Start: 2023-10-16 | End: 2023-11-13

## 2023-10-25 DIAGNOSIS — K21.9 GASTROESOPHAGEAL REFLUX DISEASE, UNSPECIFIED WHETHER ESOPHAGITIS PRESENT: ICD-10-CM

## 2023-10-25 RX ORDER — OMEPRAZOLE 40 MG/1
CAPSULE, DELAYED RELEASE ORAL
Qty: 30 CAPSULE | Refills: 5 | Status: SHIPPED | OUTPATIENT
Start: 2023-10-25

## 2023-10-30 DIAGNOSIS — F41.9 ANXIETY: ICD-10-CM

## 2023-10-30 RX ORDER — CITALOPRAM 40 MG/1
40 TABLET, FILM COATED ORAL
Qty: 30 TABLET | Refills: 0 | Status: SHIPPED | OUTPATIENT
Start: 2023-10-30 | End: 2023-11-30

## 2023-11-13 DIAGNOSIS — E78.5 HYPERLIPIDEMIA, UNSPECIFIED HYPERLIPIDEMIA TYPE: ICD-10-CM

## 2023-11-13 RX ORDER — ATORVASTATIN CALCIUM 20 MG/1
TABLET, FILM COATED ORAL
Qty: 30 TABLET | Refills: 4 | Status: SHIPPED | OUTPATIENT
Start: 2023-11-13

## 2023-11-29 ENCOUNTER — OFFICE VISIT (OUTPATIENT)
Dept: FAMILY MEDICINE | Facility: CLINIC | Age: 53
End: 2023-11-29
Payer: MEDICAID

## 2023-11-29 VITALS
TEMPERATURE: 98 F | DIASTOLIC BLOOD PRESSURE: 78 MMHG | SYSTOLIC BLOOD PRESSURE: 122 MMHG | HEIGHT: 63 IN | WEIGHT: 176 LBS | BODY MASS INDEX: 31.18 KG/M2 | OXYGEN SATURATION: 99 % | HEART RATE: 82 BPM

## 2023-11-29 DIAGNOSIS — R05.9 COUGH, UNSPECIFIED TYPE: ICD-10-CM

## 2023-11-29 DIAGNOSIS — J10.1 INFLUENZA A: Primary | ICD-10-CM

## 2023-11-29 LAB
CTP QC/QA: YES
CTP QC/QA: YES
FLUAV AG NPH QL: POSITIVE
FLUBV AG NPH QL: NEGATIVE
SARS-COV-2 AG RESP QL IA.RAPID: NEGATIVE

## 2023-11-29 PROCEDURE — 3044F HG A1C LEVEL LT 7.0%: CPT | Mod: CPTII,,, | Performed by: NURSE PRACTITIONER

## 2023-11-29 PROCEDURE — 87811 SARS-COV-2 COVID19 W/OPTIC: CPT | Mod: QW,,, | Performed by: NURSE PRACTITIONER

## 2023-11-29 PROCEDURE — 3008F PR BODY MASS INDEX (BMI) DOCUMENTED: ICD-10-PCS | Mod: CPTII,,, | Performed by: NURSE PRACTITIONER

## 2023-11-29 PROCEDURE — 3078F DIAST BP <80 MM HG: CPT | Mod: CPTII,,, | Performed by: NURSE PRACTITIONER

## 2023-11-29 PROCEDURE — 1159F PR MEDICATION LIST DOCUMENTED IN MEDICAL RECORD: ICD-10-PCS | Mod: CPTII,,, | Performed by: NURSE PRACTITIONER

## 2023-11-29 PROCEDURE — 87400 POCT INFLUENZA A/B: ICD-10-PCS | Mod: QW,,, | Performed by: NURSE PRACTITIONER

## 2023-11-29 PROCEDURE — 3078F PR MOST RECENT DIASTOLIC BLOOD PRESSURE < 80 MM HG: ICD-10-PCS | Mod: CPTII,,, | Performed by: NURSE PRACTITIONER

## 2023-11-29 PROCEDURE — 3044F PR MOST RECENT HEMOGLOBIN A1C LEVEL <7.0%: ICD-10-PCS | Mod: CPTII,,, | Performed by: NURSE PRACTITIONER

## 2023-11-29 PROCEDURE — 1159F MED LIST DOCD IN RCRD: CPT | Mod: CPTII,,, | Performed by: NURSE PRACTITIONER

## 2023-11-29 PROCEDURE — 99213 OFFICE O/P EST LOW 20 MIN: CPT | Mod: ,,, | Performed by: NURSE PRACTITIONER

## 2023-11-29 PROCEDURE — 1160F PR REVIEW ALL MEDS BY PRESCRIBER/CLIN PHARMACIST DOCUMENTED: ICD-10-PCS | Mod: CPTII,,, | Performed by: NURSE PRACTITIONER

## 2023-11-29 PROCEDURE — 87400 INFLUENZA A/B EACH AG IA: CPT | Mod: QW,,, | Performed by: NURSE PRACTITIONER

## 2023-11-29 PROCEDURE — 99213 PR OFFICE/OUTPT VISIT, EST, LEVL III, 20-29 MIN: ICD-10-PCS | Mod: ,,, | Performed by: NURSE PRACTITIONER

## 2023-11-29 PROCEDURE — 87811 SARS CORONAVIRUS 2 ANTIGEN POCT, MANUAL READ: ICD-10-PCS | Mod: QW,,, | Performed by: NURSE PRACTITIONER

## 2023-11-29 PROCEDURE — 3074F SYST BP LT 130 MM HG: CPT | Mod: CPTII,,, | Performed by: NURSE PRACTITIONER

## 2023-11-29 PROCEDURE — 1160F RVW MEDS BY RX/DR IN RCRD: CPT | Mod: CPTII,,, | Performed by: NURSE PRACTITIONER

## 2023-11-29 PROCEDURE — 3008F BODY MASS INDEX DOCD: CPT | Mod: CPTII,,, | Performed by: NURSE PRACTITIONER

## 2023-11-29 PROCEDURE — 3074F PR MOST RECENT SYSTOLIC BLOOD PRESSURE < 130 MM HG: ICD-10-PCS | Mod: CPTII,,, | Performed by: NURSE PRACTITIONER

## 2023-11-29 RX ORDER — PREDNISONE 20 MG/1
40 TABLET ORAL DAILY
Qty: 6 TABLET | Refills: 0 | Status: SHIPPED | OUTPATIENT
Start: 2023-11-29 | End: 2023-12-02

## 2023-11-29 NOTE — PROGRESS NOTES
Patient ID: Samanta Pastrana  : 1970    Chief Complaint: Cough, Sore Throat, Otalgia, Nasal Congestion, and Facial Pain (X 4 days)    Allergies: Patient has No Known Allergies.     History of Present Illness:  The patient is a 53 y.o. White female who presents to clinic for follow up on Cough, Sore Throat, Otalgia, Nasal Congestion, and Facial Pain (X 4 days) .  She complains of occasional sputum production.  She describes a persistent pressure that worsens when coughing.  Also reports subjective fever and myalgia.  Few episodes of diarrhea over the past few days.  Onset of symptoms about 4 days ago with some worsening.  Several close contacts tested positive for influenza.  Using NyQuil without improvement.  No wheezing or shortness of breath.  No vomiting.    Social History:  reports that she has been smoking cigarettes. She has a 7.5 pack-year smoking history. She has been exposed to tobacco smoke. She has never used smokeless tobacco. She reports that she does not currently use alcohol. She reports that she does not use drugs.    Past Medical History:  has a past medical history of Allergies, Anxiety, Arthritis, Bilateral hand pain, Cancer, Carpal tunnel syndrome, Cervical neuropathy, Chronic pain, COVID-19, DDD (degenerative disc disease), cervical, DDD (degenerative disc disease), lumbar, Depression, DUB (dysfunctional uterine bleeding), Fibromyalgia, GERD (gastroesophageal reflux disease), History of lobectomy of thyroid, Hyperlipidemia, Insomnia, Joint pain, Mental disorder, Obesity, unspecified, Renal cyst, Right ureteral stone, Tobacco user, and Vitamin D deficiency.    Current Medications:  Current Outpatient Medications   Medication Instructions    albuterol (PROVENTIL/VENTOLIN HFA) 90 mcg/actuation inhaler 2 puffs, Inhalation, Every 6 hours PRN    atorvastatin (LIPITOR) 20 MG tablet TAKE 1 TABLET(20 MG) BY MOUTH EVERY DAY    citalopram (CELEXA) 40 mg, Oral    ergocalciferol (ERGOCALCIFEROL)  "50,000 unit Cap TAKE ONE CAPSULE BY MOUTH ONCE A WEEK    metoprolol succinate (TOPROL-XL) 25 mg, Oral    mirtazapine (REMERON) 15 mg, Oral, Nightly    nabumetone (RELAFEN) 500 mg, Oral, 2 times daily    omeprazole (PRILOSEC) 40 MG capsule TAKE ONE CAPSULE BY MOUTH EVERY DAY    pantoprazole (PROTONIX) 40 mg, Oral, Daily    predniSONE (DELTASONE) 40 mg, Oral, Daily    pregabalin (LYRICA) 150 mg, Oral, 3 times daily    sumatriptan (IMITREX) 25 mg, Oral, Every 2 hours PRN, Do not take more than 200 mg per day.    tiZANidine (ZANAFLEX) 4 mg, Oral       ROS: See HPI    Visit Vitals  /78 (BP Location: Right arm)   Pulse 82   Temp 98.1 °F (36.7 °C) (Oral)   Ht 5' 2.99" (1.6 m)   Wt 79.8 kg (176 lb)   LMP  (LMP Unknown)   SpO2 99%   BMI 31.19 kg/m²       Physical Exam  Vitals reviewed.   Constitutional:       Appearance: Normal appearance.   HENT:      Ears:      Comments: Serous effusions bilaterally     Nose: Congestion present.      Mouth/Throat:      Pharynx: Posterior oropharyngeal erythema present. No oropharyngeal exudate.   Eyes:      Conjunctiva/sclera: Conjunctivae normal.   Cardiovascular:      Rate and Rhythm: Normal rate and regular rhythm.      Heart sounds: Normal heart sounds.   Pulmonary:      Effort: Pulmonary effort is normal.      Breath sounds: Normal breath sounds.   Musculoskeletal:      Cervical back: Normal range of motion and neck supple.   Skin:     General: Skin is warm and dry.   Neurological:      Mental Status: She is alert. Mental status is at baseline.          Influenza a:  Positive   Influenza B:  Negative   COVID-19:  Negative    Labs Reviewed:  Chemistry:  Lab Results   Component Value Date     08/16/2023    K 4.4 08/16/2023    CHLORIDE 106 08/16/2023    BUN 9.0 08/16/2023    CREATININE 0.80 08/16/2023    EGFRNORACEVR 89 08/16/2023    GLUCOSE 114 08/16/2023    CALCIUM 9.5 08/16/2023    ALKPHOS 77 08/16/2023    LABPROT 6.1 (L) 08/16/2023    ALBUMIN 4.0 08/16/2023    BILIDIR " 0.00 11/23/2019    IBILI 0.20 11/23/2019    AST 38 (H) 08/16/2023    ALT 29 08/16/2023    [id number] 48.5 08/16/2023    TSH 3.310 08/16/2023    [id number] 1.12 08/16/2023        Lab Results   Component Value Date    HGBA1C 5.5 08/16/2023        Hematology:  Lab Results   Component Value Date    WBC 7.93 08/16/2023    RBC 4.15 08/16/2023    HGB 12.8 08/16/2023    HCT 37.4 08/16/2023    MCV 90.1 08/16/2023    MCH 30.8 08/16/2023    MCHC 34.2 08/16/2023    RDW 13.8 08/16/2023     08/16/2023    MPV 9.8 08/16/2023       Lipid Panel:  Lab Results   Component Value Date    CHOL 139 08/16/2023    HDL 31 (L) 08/16/2023    DLDL 62.6 08/16/2023    TRIG 328 (H) 08/16/2023        Assessment & Plan:  1. Influenza A  -     predniSONE (DELTASONE) 20 MG tablet; Take 2 tablets (40 mg total) by mouth once daily. for 3 days  Dispense: 6 tablet; Refill: 0    2. Cough, unspecified type  -     Cancel: Influenza (FLUAD) - Quadrivalent (Adjuvanted) *Preferred* (65+) (PF)  -     SARS Coronavirus 2 Antigen, POCT Manual Read  -     POCT Influenza A/B Rapid Antigen       Prednisone 40 mg daily for 3 days  Symptomatic treatment with over-the-counter medications  Rest, hydration  If symptoms persist or worsen over the next week, plan to treat with Augmentin    Future Appointments   Date Time Provider Department Center   4/9/2024  8:00 AM Rozina Perez FNP-C [facility]   4/9/2024 10:00 AM LAB, [facility] LABORATORY DRAW STATION CHELITA RENETTA Garcia       No follow-ups on file. Call sooner if needed.    MARCIAL Gates    Lab Frequency Next Occurrence   Ambulatory referral/consult to Dermatology Once 03/02/2023   Ambulatory referral/consult to Psychiatry Once 06/02/2023   CBC Auto Differential Once 04/09/2024   Basic Metabolic Panel Once 04/09/2024   Mammo Digital Screening Bilat w/ Shay Once 10/09/2023

## 2023-11-30 DIAGNOSIS — F41.9 ANXIETY: ICD-10-CM

## 2023-11-30 RX ORDER — CITALOPRAM 40 MG/1
40 TABLET, FILM COATED ORAL
Qty: 30 TABLET | Refills: 0 | Status: SHIPPED | OUTPATIENT
Start: 2023-11-30 | End: 2024-01-02

## 2023-12-14 DIAGNOSIS — I10 HYPERTENSION, UNSPECIFIED TYPE: ICD-10-CM

## 2023-12-14 RX ORDER — METOPROLOL SUCCINATE 25 MG/1
25 TABLET, EXTENDED RELEASE ORAL
Qty: 30 TABLET | Refills: 2 | Status: SHIPPED | OUTPATIENT
Start: 2023-12-14 | End: 2024-03-06

## 2024-01-02 DIAGNOSIS — F41.9 ANXIETY: ICD-10-CM

## 2024-01-02 RX ORDER — CITALOPRAM 40 MG/1
40 TABLET, FILM COATED ORAL
Qty: 30 TABLET | Refills: 3 | Status: SHIPPED | OUTPATIENT
Start: 2024-01-02

## 2024-01-29 DIAGNOSIS — E55.9 VITAMIN D DEFICIENCY: ICD-10-CM

## 2024-01-29 RX ORDER — ERGOCALCIFEROL 1.25 1/1
50000 CAPSULE ORAL
Qty: 5 CAPSULE | Refills: 6 | Status: SHIPPED | OUTPATIENT
Start: 2024-01-29

## 2024-02-23 ENCOUNTER — TELEPHONE (OUTPATIENT)
Dept: FAMILY MEDICINE | Facility: CLINIC | Age: 54
End: 2024-02-23
Payer: MEDICAID

## 2024-02-23 NOTE — TELEPHONE ENCOUNTER
Called patient in regards to breast cancer screening patient is aware that order is in chart. She was given the number to radiology to call and reschedule her apt.

## 2024-03-06 DIAGNOSIS — I10 HYPERTENSION, UNSPECIFIED TYPE: ICD-10-CM

## 2024-03-06 RX ORDER — METOPROLOL SUCCINATE 25 MG/1
25 TABLET, EXTENDED RELEASE ORAL
Qty: 30 TABLET | Refills: 2 | Status: SHIPPED | OUTPATIENT
Start: 2024-03-06 | End: 2024-04-10 | Stop reason: SDUPTHER

## 2024-03-19 ENCOUNTER — OFFICE VISIT (OUTPATIENT)
Dept: FAMILY MEDICINE | Facility: CLINIC | Age: 54
End: 2024-03-19
Payer: MEDICAID

## 2024-03-19 VITALS
OXYGEN SATURATION: 98 % | BODY MASS INDEX: 31.01 KG/M2 | HEART RATE: 90 BPM | HEIGHT: 63 IN | SYSTOLIC BLOOD PRESSURE: 110 MMHG | TEMPERATURE: 97 F | DIASTOLIC BLOOD PRESSURE: 68 MMHG | WEIGHT: 175 LBS

## 2024-03-19 DIAGNOSIS — R10.9 ABDOMINAL PAIN, UNSPECIFIED ABDOMINAL LOCATION: ICD-10-CM

## 2024-03-19 DIAGNOSIS — R10.9 ACUTE RIGHT FLANK PAIN: Primary | ICD-10-CM

## 2024-03-19 DIAGNOSIS — R30.0 DYSURIA: ICD-10-CM

## 2024-03-19 LAB
BILIRUB SERPL-MCNC: NEGATIVE MG/DL
BLOOD URINE, POC: NORMAL
CLARITY, POC UA: CLEAR
COLOR, POC UA: NORMAL
GLUCOSE UR QL STRIP: NEGATIVE
KETONES UR QL STRIP: NEGATIVE
LEUKOCYTE ESTERASE URINE, POC: NORMAL
NITRITE, POC UA: NEGATIVE
PH, POC UA: 7
PROTEIN, POC: NEGATIVE
SPECIFIC GRAVITY, POC UA: 1.01
UROBILINOGEN, POC UA: 0.2

## 2024-03-19 PROCEDURE — 1159F MED LIST DOCD IN RCRD: CPT | Mod: CPTII,,, | Performed by: NURSE PRACTITIONER

## 2024-03-19 PROCEDURE — 99214 OFFICE O/P EST MOD 30 MIN: CPT | Mod: ,,, | Performed by: NURSE PRACTITIONER

## 2024-03-19 PROCEDURE — 3078F DIAST BP <80 MM HG: CPT | Mod: CPTII,,, | Performed by: NURSE PRACTITIONER

## 2024-03-19 PROCEDURE — 3008F BODY MASS INDEX DOCD: CPT | Mod: CPTII,,, | Performed by: NURSE PRACTITIONER

## 2024-03-19 PROCEDURE — 81002 URINALYSIS NONAUTO W/O SCOPE: CPT | Mod: ,,, | Performed by: NURSE PRACTITIONER

## 2024-03-19 PROCEDURE — 3074F SYST BP LT 130 MM HG: CPT | Mod: CPTII,,, | Performed by: NURSE PRACTITIONER

## 2024-03-19 RX ORDER — NITROFURANTOIN 25; 75 MG/1; MG/1
100 CAPSULE ORAL 2 TIMES DAILY
Qty: 14 CAPSULE | Refills: 0 | Status: SHIPPED | OUTPATIENT
Start: 2024-03-19 | End: 2024-03-26

## 2024-03-19 NOTE — PROGRESS NOTES
"SUBJECTIVE:  Samanta Pastrana is a 54 y.o. female here for Dysuria and Back Pain (Right side)      HPI  Presents to the clinic with c/o right flak pain and dysuria.  Symptoms for 2 days.  Has been using OTC meds with no improvement.  Does have PMH of kidney stone, but does not feel the pain is the same  Griseldas allergies, medications, history, and problem list were updated as appropriate.    Review of Systems   Genitourinary:  Positive for dysuria and flank pain.      A comprehensive review of symptoms was completed and negative except as noted above.    Recent Results (from the past 504 hour(s))   POCT URINE DIPSTICK WITHOUT MICROSCOPE    Collection Time: 03/19/24  2:20 PM   Result Value Ref Range    Color, UA Light Yellow     pH, UA 7.0     WBC, UA Small     Nitrite, UA Negative     Protein, POC Negative     Glucose, UA negative     Ketones, UA Negative     Urobilinogen, UA 0.2     Bilirubin, POC Negative     Blood, UA Small     Clarity, UA Clear     Spec Grav UA 1.010        OBJECTIVE:  Vital signs  Vitals:    03/19/24 1406   BP: 110/68   BP Location: Right arm   Patient Position: Sitting   Pulse: 90   Temp: 97.3 °F (36.3 °C)   TempSrc: Temporal   SpO2: 98%   Weight: 79.4 kg (175 lb)   Height: 5' 2.99" (1.6 m)        Physical Exam  Constitutional:       Appearance: Normal appearance.   HENT:      Head: Normocephalic and atraumatic.      Nose: Nose normal.      Mouth/Throat:      Mouth: Mucous membranes are moist.      Pharynx: Oropharynx is clear.   Eyes:      Extraocular Movements: Extraocular movements intact.      Conjunctiva/sclera: Conjunctivae normal.      Pupils: Pupils are equal, round, and reactive to light.   Cardiovascular:      Rate and Rhythm: Normal rate and regular rhythm.   Pulmonary:      Effort: Pulmonary effort is normal.      Breath sounds: Normal breath sounds.   Abdominal:      General: Bowel sounds are normal.      Palpations: Abdomen is soft.      Tenderness: There is generalized abdominal " tenderness and tenderness in the suprapubic area. There is right CVA tenderness.   Musculoskeletal:      Lumbar back: Decreased range of motion (guarded).   Skin:     General: Skin is warm.      Capillary Refill: Capillary refill takes less than 2 seconds.   Neurological:      Mental Status: She is alert.   Psychiatric:         Mood and Affect: Mood normal.         Behavior: Behavior normal.         Judgment: Judgment normal.          ASSESSMENT/PLAN:  1. Acute right flank pain  Comments:  continue OTC meds for pain prn  Orders:  -     nitrofurantoin, macrocrystal-monohydrate, (MACROBID) 100 MG capsule; Take 1 capsule (100 mg total) by mouth 2 (two) times daily. for 7 days  Dispense: 14 capsule; Refill: 0    2. Dysuria  Comments:  increase fluids  Orders:  -     POCT URINE DIPSTICK WITHOUT MICROSCOPE  -     Urine Culture High Risk    3. Abdominal pain, unspecified abdominal location  -     CT Renal Stone Study ABD Pelvis WO; Future; Expected date: 03/19/2024        Follow Up:  Follow up if symptoms worsen or fail to improve.

## 2024-03-22 LAB — BACTERIA UR CULT: ABNORMAL

## 2024-03-25 ENCOUNTER — TELEPHONE (OUTPATIENT)
Dept: FAMILY MEDICINE | Facility: CLINIC | Age: 54
End: 2024-03-25
Payer: MEDICAID

## 2024-04-02 PROCEDURE — 80048 BASIC METABOLIC PNL TOTAL CA: CPT | Performed by: NURSE PRACTITIONER

## 2024-04-02 PROCEDURE — 85025 COMPLETE CBC W/AUTO DIFF WBC: CPT | Performed by: NURSE PRACTITIONER

## 2024-04-10 ENCOUNTER — OFFICE VISIT (OUTPATIENT)
Dept: FAMILY MEDICINE | Facility: CLINIC | Age: 54
End: 2024-04-10
Payer: MEDICAID

## 2024-04-10 ENCOUNTER — PATIENT MESSAGE (OUTPATIENT)
Dept: FAMILY MEDICINE | Facility: CLINIC | Age: 54
End: 2024-04-10

## 2024-04-10 VITALS
WEIGHT: 174 LBS | BODY MASS INDEX: 30.83 KG/M2 | HEIGHT: 63 IN | TEMPERATURE: 98 F | HEART RATE: 91 BPM | DIASTOLIC BLOOD PRESSURE: 80 MMHG | SYSTOLIC BLOOD PRESSURE: 118 MMHG | OXYGEN SATURATION: 98 %

## 2024-04-10 DIAGNOSIS — F41.9 ANXIETY: ICD-10-CM

## 2024-04-10 DIAGNOSIS — M54.9 BACK PAIN, UNSPECIFIED BACK LOCATION, UNSPECIFIED BACK PAIN LATERALITY, UNSPECIFIED CHRONICITY: Primary | ICD-10-CM

## 2024-04-10 DIAGNOSIS — M51.36 DEGENERATIVE DISC DISEASE, LUMBAR: ICD-10-CM

## 2024-04-10 DIAGNOSIS — G47.00 INSOMNIA, UNSPECIFIED TYPE: ICD-10-CM

## 2024-04-10 DIAGNOSIS — K21.9 GASTROESOPHAGEAL REFLUX DISEASE, UNSPECIFIED WHETHER ESOPHAGITIS PRESENT: ICD-10-CM

## 2024-04-10 DIAGNOSIS — Z12.31 SCREENING MAMMOGRAM FOR BREAST CANCER: ICD-10-CM

## 2024-04-10 DIAGNOSIS — M51.34 DDD (DEGENERATIVE DISC DISEASE), THORACIC: ICD-10-CM

## 2024-04-10 DIAGNOSIS — I10 HYPERTENSION, UNSPECIFIED TYPE: ICD-10-CM

## 2024-04-10 DIAGNOSIS — E78.5 HYPERLIPIDEMIA, UNSPECIFIED HYPERLIPIDEMIA TYPE: ICD-10-CM

## 2024-04-10 DIAGNOSIS — M50.30 DEGENERATIVE DISC DISEASE, CERVICAL: ICD-10-CM

## 2024-04-10 PROCEDURE — 1159F MED LIST DOCD IN RCRD: CPT | Mod: CPTII,,, | Performed by: NURSE PRACTITIONER

## 2024-04-10 PROCEDURE — 3074F SYST BP LT 130 MM HG: CPT | Mod: CPTII,,, | Performed by: NURSE PRACTITIONER

## 2024-04-10 PROCEDURE — 1160F RVW MEDS BY RX/DR IN RCRD: CPT | Mod: CPTII,,, | Performed by: NURSE PRACTITIONER

## 2024-04-10 PROCEDURE — 99214 OFFICE O/P EST MOD 30 MIN: CPT | Mod: ,,, | Performed by: NURSE PRACTITIONER

## 2024-04-10 PROCEDURE — 3008F BODY MASS INDEX DOCD: CPT | Mod: CPTII,,, | Performed by: NURSE PRACTITIONER

## 2024-04-10 PROCEDURE — 3079F DIAST BP 80-89 MM HG: CPT | Mod: CPTII,,, | Performed by: NURSE PRACTITIONER

## 2024-04-10 RX ORDER — OMEPRAZOLE 40 MG/1
40 CAPSULE, DELAYED RELEASE ORAL DAILY
Qty: 30 CAPSULE | Refills: 5 | Status: SHIPPED | OUTPATIENT
Start: 2024-04-10

## 2024-04-10 RX ORDER — CITALOPRAM 40 MG/1
40 TABLET, FILM COATED ORAL DAILY
Qty: 90 TABLET | Refills: 3 | Status: SHIPPED | OUTPATIENT
Start: 2024-04-10

## 2024-04-10 RX ORDER — MIRTAZAPINE 15 MG/1
15 TABLET, FILM COATED ORAL NIGHTLY
Qty: 90 TABLET | Refills: 3 | Status: SHIPPED | OUTPATIENT
Start: 2024-04-10

## 2024-04-10 RX ORDER — ATORVASTATIN CALCIUM 20 MG/1
20 TABLET, FILM COATED ORAL NIGHTLY
Qty: 90 TABLET | Refills: 3 | Status: SHIPPED | OUTPATIENT
Start: 2024-04-10 | End: 2024-04-29

## 2024-04-10 RX ORDER — METOPROLOL SUCCINATE 25 MG/1
25 TABLET, EXTENDED RELEASE ORAL DAILY
Qty: 90 TABLET | Refills: 3 | Status: SHIPPED | OUTPATIENT
Start: 2024-04-10

## 2024-04-10 NOTE — PROGRESS NOTES
Patient ID: [id number]     Chief Complaint: Hyperlipidemia and Hypertension (With labs )      HPI:     Samanta Pastrana is a 54 y.o. female here today for Hyperlipidemia and Hypertension (With labs )  She stopped taking gabapentin and lyrica.  It was causing her to gain weight.  She's c/o middle back pain with spasms around the trunk. She has known DDD in cervical, thoracic, lumbar spine.  She's not been able to go to PT over the last several months because she had to wait until she was released after her last neck surgery.  She reports that her neurosurgeon only works on neck is and low back.  She would like to see an orthopedic in [place] that her sister has seen.  He operates on the entire back.    Past Medical History:  has a past medical history of Allergies, Anxiety, Arthritis, Bilateral hand pain, Cancer, Carpal tunnel syndrome, Cervical neuropathy, Chronic pain, COVID-19, DDD (degenerative disc disease), cervical, DDD (degenerative disc disease), lumbar, Depression, DUB (dysfunctional uterine bleeding), Fibromyalgia, GERD (gastroesophageal reflux disease), History of lobectomy of thyroid, Hyperlipidemia, Insomnia, Joint pain, Mental disorder, Obesity, unspecified, Renal cyst, Right ureteral stone, Tobacco user, and Vitamin D deficiency.    Surgical History:  has a past surgical history that includes Tubal ligation; Colonoscopy (03/07/2017); Cervical fusion; Colonoscopy (08/08/2019); Excision of lesion of eyelid (01/25/2018); excision of face skin (01/25/2018); Cervical biopsy w/ loop electrode excision (2013); excision of breast lump (2011); Eye surgery (2019); Breast surgery (Dont remember); and Neck surgery.    Family History: family history includes Bipolar disorder in her brother and mother; COPD in her brother, mother, and sister; Cancer in her father and mother; Cataracts in her father; Diabetes in her brother, mother, and sister; Drug abuse in her brother; Graves' disease in her sister; Heart disease  "in her brother and mother; Heart failure in her mother; Hypertension in her mother, sister, and sister; Hyperthyroidism in her sister; Kidney disease in her brother; Mental illness in her brother and mother; Migraines in her sister; No Known Problems in her maternal aunt, maternal grandfather, maternal grandmother, maternal uncle, paternal aunt, paternal grandfather, paternal grandmother, and paternal uncle; Post-traumatic stress disorder in her brother; Schizophrenia in her brother; Stroke in her sister; Thyroid cancer in her sister.    Social History:  reports that she has been smoking cigarettes. She has a 7.5 pack-year smoking history. She has been exposed to tobacco smoke. She has never used smokeless tobacco. She reports that she does not currently use alcohol. She reports that she does not use drugs.    Current Outpatient Medications   Medication Instructions    albuterol (PROVENTIL/VENTOLIN HFA) 90 mcg/actuation inhaler 2 puffs, Inhalation, Every 6 hours PRN    atorvastatin (LIPITOR) 20 mg, Oral, Nightly    citalopram (CELEXA) 40 mg, Oral, Daily    metoprolol succinate (TOPROL-XL) 25 mg, Oral, Daily    mirtazapine (REMERON) 15 mg, Oral, Nightly    omeprazole (PRILOSEC) 40 mg, Oral, Daily    tiZANidine (ZANAFLEX) 4 mg, Oral    VITAMIN D2 50,000 Units, Oral, Every 7 days       Patient has No Known Allergies.     Patient Care Team:  Rozina Perez FNP-C as PCP - General (Family Medicine)  Chris Vázquez MD (Neurosurgery)  Rui Ortiz MD as Consulting Physician (Neurology)       Subjective:     Review of Systems    See HPI     Objective:     Visit Vitals  /80 (BP Location: Left arm)   Pulse 91   Temp 98.1 °F (36.7 °C) (Temporal)   Ht 5' 2.99" (1.6 m)   Wt 78.9 kg (174 lb)   LMP  (LMP Unknown)   SpO2 98%   BMI 30.83 kg/m²       Physical Exam  Vitals reviewed.   Constitutional:       Appearance: Normal appearance.   HENT:      Head: Normocephalic and atraumatic.   Cardiovascular:      Rate and " Rhythm: Normal rate and regular rhythm.   Pulmonary:      Effort: Pulmonary effort is normal.      Breath sounds: Normal breath sounds.   Abdominal:      General: Bowel sounds are normal.      Palpations: Abdomen is soft.   Musculoskeletal:      Cervical back: Rigidity present.   Lymphadenopathy:      Cervical: No cervical adenopathy.   Skin:     General: Skin is warm and dry.   Neurological:      Mental Status: She is alert and oriented to person, place, and time.   Psychiatric:         Mood and Affect: Mood normal.         Behavior: Behavior normal.         Labs Reviewed:     Chemistry:  Lab Results   Component Value Date     04/02/2024    K 4.1 04/02/2024    CHLORIDE 108 04/02/2024    BUN 8.0 04/02/2024    CREATININE 0.67 04/02/2024    EGFRNORACEVR >90 04/02/2024    GLUCOSE 125 (H) 04/02/2024    CALCIUM 9.5 04/02/2024    ALKPHOS 77 08/16/2023    LABPROT 6.1 (L) 08/16/2023    ALBUMIN 4.0 08/16/2023    BILIDIR 0.00 11/23/2019    IBILI 0.20 11/23/2019    AST 38 (H) 08/16/2023    ALT 29 08/16/2023    TTLWTHII91WT 48.5 08/16/2023    TSH 3.310 08/16/2023    FJBPVK9CRMG 1.12 08/16/2023        Lab Results   Component Value Date    HGBA1C 5.5 08/16/2023        Hematology:  Lab Results   Component Value Date    WBC 7.02 04/02/2024    RBC 4.26 04/02/2024    HGB 13.3 04/02/2024    HCT 37.9 04/02/2024    MCV 89.0 04/02/2024    MCH 31.2 04/02/2024    MCHC 35.1 04/02/2024    RDW 12.9 04/02/2024     (H) 04/02/2024    MPV 10.0 04/02/2024       Assessment & Plan:     1. Back pain, unspecified back location, unspecified back pain laterality, unspecified chronicity  Comments:  start PT for thoracic and lumbar back pain  Orders:  -     Ambulatory referral/consult to Physical/Occupational Therapy; Future; Expected date: 04/17/2024    2. DDD (degenerative disc disease), thoracic  Assessment & Plan:  Referring to ortho she requested in [place] for further evaluation of her neck and back.      3. Degenerative disc disease,  lumbar  -     Ambulatory referral/consult to Orthopedics; Future; Expected date: 04/17/2024    4. Degenerative disc disease, cervical  -     Ambulatory referral/consult to Orthopedics; Future; Expected date: 04/17/2024    5. Screening mammogram for breast cancer  Comments:  has cancelled numerous mammograms. encouraged to complete mammogram within the next 2 weeks.  Orders:  -     Mammo Digital Screening Bilat w/ Shay; Future; Expected date: 04/10/2024    6. Insomnia, unspecified type  -     mirtazapine (REMERON) 15 MG tablet; Take 1 tablet (15 mg total) by mouth every evening.  Dispense: 90 tablet; Refill: 3    7. Anxiety  -     citalopram (CELEXA) 40 MG tablet; Take 1 tablet (40 mg total) by mouth once daily.  Dispense: 90 tablet; Refill: 3    8. Hypertension, unspecified type  -     metoprolol succinate (TOPROL-XL) 25 MG 24 hr tablet; Take 1 tablet (25 mg total) by mouth once daily.  Dispense: 90 tablet; Refill: 3    9. Gastroesophageal reflux disease, unspecified whether esophagitis present  -     omeprazole (PRILOSEC) 40 MG capsule; Take 1 capsule (40 mg total) by mouth once daily.  Dispense: 30 capsule; Refill: 5    10. Hyperlipidemia, unspecified hyperlipidemia type  -     atorvastatin (LIPITOR) 20 MG tablet; Take 1 tablet (20 mg total) by mouth every evening.  Dispense: 90 tablet; Refill: 3       Follow up for 1), 6 mo f/u, Wellness, fasting labs prior. In addition to their scheduled follow up, the patient has also been instructed to follow up on as needed basis.

## 2024-04-29 DIAGNOSIS — E78.5 HYPERLIPIDEMIA, UNSPECIFIED HYPERLIPIDEMIA TYPE: ICD-10-CM

## 2024-04-29 RX ORDER — ATORVASTATIN CALCIUM 20 MG/1
20 TABLET, FILM COATED ORAL
Qty: 30 TABLET | Refills: 11 | Status: SHIPPED | OUTPATIENT
Start: 2024-04-29

## 2024-07-09 ENCOUNTER — HOSPITAL ENCOUNTER (EMERGENCY)
Facility: HOSPITAL | Age: 54
Discharge: HOME | End: 2024-07-09
Payer: MEDICAID

## 2024-07-09 VITALS
OXYGEN SATURATION: 98 % | WEIGHT: 185 LBS | TEMPERATURE: 99 F | BODY MASS INDEX: 32.78 KG/M2 | SYSTOLIC BLOOD PRESSURE: 119 MMHG | DIASTOLIC BLOOD PRESSURE: 77 MMHG | HEIGHT: 63 IN | HEART RATE: 99 BPM | RESPIRATION RATE: 18 BRPM

## 2024-07-09 DIAGNOSIS — R05.9 COUGH: ICD-10-CM

## 2024-07-09 DIAGNOSIS — K04.7 DENTAL ABSCESS: Primary | ICD-10-CM

## 2024-07-09 LAB
INFLUENZA A (OHS): NEGATIVE
INFLUENZA B (OHS): NEGATIVE
RAPID GROUP A STREP (OHS): NEGATIVE
SARS-COV-2 RDRP RESP QL NAA+PROBE: NEGATIVE

## 2024-07-09 PROCEDURE — 87400 INFLUENZA A/B EACH AG IA: CPT | Performed by: NURSE PRACTITIONER

## 2024-07-09 PROCEDURE — 25000003 PHARM REV CODE 250: Performed by: NURSE PRACTITIONER

## 2024-07-09 PROCEDURE — 99283 EMERGENCY DEPT VISIT LOW MDM: CPT | Mod: 25

## 2024-07-09 PROCEDURE — 87651 STREP A DNA AMP PROBE: CPT

## 2024-07-09 PROCEDURE — U0002 COVID-19 LAB TEST NON-CDC: HCPCS

## 2024-07-09 RX ORDER — AMOXICILLIN AND CLAVULANATE POTASSIUM 875; 125 MG/1; MG/1
1 TABLET, FILM COATED ORAL 2 TIMES DAILY
Qty: 20 TABLET | Refills: 0 | Status: SHIPPED | OUTPATIENT
Start: 2024-07-09 | End: 2024-07-19

## 2024-07-09 RX ORDER — IBUPROFEN 400 MG/1
800 TABLET ORAL
Status: COMPLETED | OUTPATIENT
Start: 2024-07-09 | End: 2024-07-09

## 2024-07-09 RX ORDER — AMOXICILLIN AND CLAVULANATE POTASSIUM 875; 125 MG/1; MG/1
1 TABLET, FILM COATED ORAL
Status: COMPLETED | OUTPATIENT
Start: 2024-07-09 | End: 2024-07-09

## 2024-07-09 RX ADMIN — AMOXICILLIN AND CLAVULANATE POTASSIUM 1 TABLET: 875; 125 TABLET, FILM COATED ORAL at 08:07

## 2024-07-09 RX ADMIN — IBUPROFEN 800 MG: 400 TABLET ORAL at 07:07

## 2024-07-09 NOTE — Clinical Note
"Samanta Marshalla" Zeina was seen and treated in our emergency department on 7/9/2024.  She may return to work on 07/11/2024.       If you have any questions or concerns, please don't hesitate to call.      Sanjuanita Ronquillo, MARCIAL"

## 2024-07-10 NOTE — ED PROVIDER NOTES
"Encounter Date: 7/9/2024       History     Chief Complaint   Patient presents with    Fever    Shortness of Breath     Pt reports "my lungs hurt when I breathe". Has a sore thrown and swollen glands and is having problems with her teeth and running a fever for the past 2 days. Took tylenol approx 1600 this afternoon for pain.      This 54-year-old female Patient states "it hurts to breathe", she is also complaining of fever, bilateral cheek pain, throat and swollen lymph nodes. Patient states that she stood in line today at [facility] and was not given a dental appointment.      Review of patient's allergies indicates:  No Known Allergies  Past Medical History:   Diagnosis Date    Allergies     Anxiety     Arthritis     Bilateral hand pain     Cancer     Carpal tunnel syndrome     Cervical neuropathy     Chronic pain     COVID-19     DDD (degenerative disc disease), cervical     DDD (degenerative disc disease), lumbar     Depression     DUB (dysfunctional uterine bleeding)     Fibromyalgia     GERD (gastroesophageal reflux disease)     History of lobectomy of thyroid     Hyperlipidemia     Insomnia     Joint pain     Mental disorder     Obesity, unspecified     Renal cyst     Right ureteral stone     Tobacco user     Vitamin D deficiency      Past Surgical History:   Procedure Laterality Date    BREAST SURGERY  Dont remember    CERVICAL BIOPSY  W/ LOOP ELECTRODE EXCISION  2013 2010    CERVICAL FUSION      COLONOSCOPY  03/07/2017    Dr Sal Maynard    COLONOSCOPY  08/08/2019    excision of breast lump  2011    excision of face skin  01/25/2018    EXCISION OF LESION OF EYELID  01/25/2018    EYE SURGERY  2019    NECK SURGERY      TUBAL LIGATION       Family History   Problem Relation Name Age of Onset    Cancer Mother Sujey Reyna     Diabetes Mother Sujey Lambertzachary     Heart disease Mother Sujey Lambertzachary     Bipolar disorder Mother Sujey Reyna     COPD Mother Sujey Reyna     Heart failure Mother Sujey Lambertzachary     " Hypertension Mother Sujey Reyna     Mental illness Mother Sujey Reyna     Cancer Father Gustavo Reyna     Cataracts Father Gustavo Reyna     Graves' disease Sister Migdalia LeBLeu     Hypertension Sister Migdalia LeBLeu     Hyperthyroidism Sister Migdalia LeBLeu     Migraines Sister Migdalia LeBLeu     Thyroid cancer Sister Migdalia LeBLeu     COPD Sister Migdalia LeBLeu     Bipolar disorder Brother Isaías Reyna     COPD Brother Isaías Reyna     Post-traumatic stress disorder Brother Isaías Reyna     Schizophrenia Brother Isaías Reyna     Heart disease Brother Isaías Reyna     No Known Problems Maternal Aunt      No Known Problems Maternal Uncle      No Known Problems Paternal Aunt      No Known Problems Paternal Uncle      No Known Problems Maternal Grandmother      No Known Problems Maternal Grandfather      No Known Problems Paternal Grandmother      No Known Problems Paternal Grandfather      Diabetes Sister Madelyn Candi     Hypertension Sister Madelyn Candi     Diabetes Brother Gustaov Reyna     Drug abuse Brother Ganga Reyna     Mental illness Brother Ganga Reyna     Kidney disease Brother Emma Reyna     Stroke Sister Lizette Reyna [place]     Aneurysm Neg Hx      Clotting disorder Neg Hx      Dementia Neg Hx      Fainting Neg Hx      Hyperlipidemia Neg Hx      Liver disease Neg Hx      Neuropathy Neg Hx      Obesity Neg Hx      Parkinsonism Neg Hx      Seizures Neg Hx      Tremor Neg Hx       Social History     Tobacco Use    Smoking status: Every Day     Current packs/day: 0.50     Average packs/day: 0.5 packs/day for 15.0 years (7.5 ttl pk-yrs)     Types: Cigarettes     Passive exposure: Current    Smokeless tobacco: Never   Substance Use Topics    Alcohol use: Not Currently    Drug use: Never     Review of Systems   Constitutional:  Positive for fever.   HENT:  Positive for sore throat.         Bilateral cheek pain   Respiratory:          Painful respiration   All other systems reviewed and are  "negative.      Physical Exam     Initial Vitals [07/09/24 1933]   BP Pulse Resp Temp SpO2   123/71 109 18 (!) 101.1 °F (38.4 °C) 96 %      MAP       --         Physical Exam    Nursing note and vitals reviewed.  Constitutional: She appears well-developed and well-nourished.   HENT:   Head: Normocephalic and atraumatic.   Rt upper 2nd molar abscess with erythema and swelling   Eyes: Pupils are equal, round, and reactive to light.   Neck: Neck supple.   Normal range of motion.  Cardiovascular:  Normal rate, regular rhythm and normal heart sounds.           Pulmonary/Chest: Breath sounds normal.   Abdominal: Abdomen is soft. Bowel sounds are normal.   Musculoskeletal:         General: Normal range of motion.      Cervical back: Normal range of motion and neck supple.     Neurological: She is alert and oriented to person, place, and time. She has normal strength.   Skin: Skin is warm and dry. Capillary refill takes less than 2 seconds.   Psychiatric: She has a normal mood and affect. Her behavior is normal. Judgment and thought content normal.         ED Course   Procedures  Labs Reviewed   SARS-COV-2 RNA AMPLIFICATION, QUAL          Imaging Results    None          Medications - No data to display  Medical Decision Making  This 54-year-old female Patient states "it hurts to breathe", she is also complaining of fever, bilateral cheek pain, throat and swollen lymph nodes.  Patient states that she stood in line today at [facility] and was not given a dental appointment.  COVID, flu, strep results are all negative  ER diagnosis- dental abscess  Differential diagnosis includes but is not limited to COVID, flu, strep pharyngitis, all of these diagnoses are less likely related to exam and negative lab results  This patient will be discharged home stable.  She will follow up with her primary care provider and dentist of choice this week.  If she has any increase in pain, swelling or other concerns she will return to the ER for " further evaluation    Amount and/or Complexity of Data Reviewed  Radiology: independent interpretation performed.    Risk  Prescription drug management.                                      Clinical Impression:  Final diagnoses:  [R05.9] Cough                 Yg, Sanjuanita FORD, FNP  07/09/24 [id number]

## 2024-07-16 ENCOUNTER — OFFICE VISIT (OUTPATIENT)
Dept: FAMILY MEDICINE | Facility: CLINIC | Age: 54
End: 2024-07-16
Payer: MEDICAID

## 2024-07-16 VITALS
BODY MASS INDEX: 30.65 KG/M2 | DIASTOLIC BLOOD PRESSURE: 72 MMHG | HEART RATE: 94 BPM | SYSTOLIC BLOOD PRESSURE: 114 MMHG | HEIGHT: 63 IN | WEIGHT: 173 LBS | OXYGEN SATURATION: 99 % | TEMPERATURE: 98 F

## 2024-07-16 DIAGNOSIS — K08.89 PAIN, DENTAL: ICD-10-CM

## 2024-07-16 DIAGNOSIS — K04.7 DENTAL ABSCESS: Primary | ICD-10-CM

## 2024-07-16 PROCEDURE — 1159F MED LIST DOCD IN RCRD: CPT | Mod: CPTII,,, | Performed by: NURSE PRACTITIONER

## 2024-07-16 PROCEDURE — 3078F DIAST BP <80 MM HG: CPT | Mod: CPTII,,, | Performed by: NURSE PRACTITIONER

## 2024-07-16 PROCEDURE — 3008F BODY MASS INDEX DOCD: CPT | Mod: CPTII,,, | Performed by: NURSE PRACTITIONER

## 2024-07-16 PROCEDURE — 1160F RVW MEDS BY RX/DR IN RCRD: CPT | Mod: CPTII,,, | Performed by: NURSE PRACTITIONER

## 2024-07-16 PROCEDURE — 99213 OFFICE O/P EST LOW 20 MIN: CPT | Mod: ,,, | Performed by: NURSE PRACTITIONER

## 2024-07-16 PROCEDURE — 3074F SYST BP LT 130 MM HG: CPT | Mod: CPTII,,, | Performed by: NURSE PRACTITIONER

## 2024-07-16 RX ORDER — CEPHALEXIN 500 MG/1
500 CAPSULE ORAL EVERY 6 HOURS
Qty: 56 CAPSULE | Refills: 0 | Status: SHIPPED | OUTPATIENT
Start: 2024-07-16 | End: 2024-07-30

## 2024-07-16 NOTE — ASSESSMENT & PLAN NOTE
No decrease in pain since the start of Augmentin.  Fever subsided but on ibuprofen around the clock.  Change Augmentin to cephalexin.  If no improvement, consider adding metronidazole TID.  Encouraged to contact a dentist as soon as she can, provided with a name and number.  She will get dental insurance today.  RTC SABRAOP.

## 2024-07-16 NOTE — PROGRESS NOTES
Patient ID: [id number]     Chief Complaint: Dental Pain      HPI:     Samanta Pastrana is a 54 y.o. female in the office for Dental Pain  She's been taking Augmentin since 7/9/24 when she went to the ER.  She's going get dental insurance today so she can get in with a dentist soon.  Pain has not improved with the antibiotics.  She's still taking ibuprofen around the clock.  She does not have fever anymore.  Still having facial pain and can't tolerate heat/being outside.     Past Medical History:  has a past medical history of Allergies, Anxiety, Arthritis, Bilateral hand pain, Cancer, Carpal tunnel syndrome, Cervical neuropathy, Chronic pain, COVID-19, DDD (degenerative disc disease), cervical, DDD (degenerative disc disease), lumbar, Depression, DUB (dysfunctional uterine bleeding), Fibromyalgia, GERD (gastroesophageal reflux disease), History of lobectomy of thyroid, Hyperlipidemia, Insomnia, Joint pain, Mental disorder, Obesity, unspecified, Renal cyst, Right ureteral stone, Tobacco user, and Vitamin D deficiency.    Social History:  reports that she has been smoking cigarettes. She has a 7.5 pack-year smoking history. She has been exposed to tobacco smoke. She has never used smokeless tobacco. She reports that she does not currently use alcohol. She reports that she does not use drugs.    Current Outpatient Medications   Medication Instructions    albuterol (PROVENTIL/VENTOLIN HFA) 90 mcg/actuation inhaler 2 puffs, Inhalation, Every 6 hours PRN    atorvastatin (LIPITOR) 20 mg, Oral    cephALEXin (KEFLEX) 500 mg, Oral, Every 6 hours    citalopram (CELEXA) 40 mg, Oral, Daily    metoprolol succinate (TOPROL-XL) 25 mg, Oral, Daily    mirtazapine (REMERON) 15 mg, Oral, Nightly    omeprazole (PRILOSEC) 40 mg, Oral, Daily    tiZANidine (ZANAFLEX) 4 mg, Oral    VITAMIN D2 50,000 Units, Oral, Every 7 days       Patient has No Known Allergies.     Patient Care Team:  Rozina Perez FNP-C as PCP - General (Family  "Medicine)  Chris Vázquez MD (Neurosurgery)  Rui Ortiz MD as Consulting Physician (Neurology)     Subjective     Review of Systems    See HPI     Objective     Visit Vitals  /72 (BP Location: Right arm, Patient Position: Sitting, BP Method: Medium (Manual))   Pulse 94   Temp 98.2 °F (36.8 °C) (Temporal)   Ht 5' 2.99" (1.6 m)   Wt 78.5 kg (173 lb)   LMP  (LMP Unknown)   SpO2 99%   BMI 30.65 kg/m²       Physical Exam  Constitutional:       Appearance: Normal appearance.   HENT:      Mouth/Throat:      Lips: Pink.      Mouth: Mucous membranes are dry.      Dentition: Dental tenderness and dental abscesses present.      Pharynx: Oropharynx is clear.   Cardiovascular:      Rate and Rhythm: Normal rate and regular rhythm.   Pulmonary:      Effort: Pulmonary effort is normal.      Breath sounds: Normal breath sounds.   Skin:     General: Skin is warm and dry.   Neurological:      Mental Status: She is alert.       Assessment & Plan:     1. Dental abscess  Overview:  Augmentin 7/9/24.     Assessment & Plan:  No decrease in pain since the start of Augmentin.  Fever subsided but on ibuprofen around the clock.  Change Augmentin to cephalexin.  If no improvement, consider adding metronidazole TID.  Encouraged to contact a dentist as soon as she can, provided with a name and number.  She will get dental insurance today.  RTC SWOP.      2. Pain, dental    Other orders  -     cephALEXin (KEFLEX) 500 MG capsule; Take 1 capsule (500 mg total) by mouth every 6 (six) hours. for 14 days  Dispense: 56 capsule; Refill: 0         Follow up for Keep appointment as scheduled.In addition to their next scheduled appointment, the patient has also been instructed to follow up on as needed basis.     Future Appointments   Date Time Provider Department Center   10/4/2024  8:40 AM LAB, [facility] LABORATORY DRAW STATION CHELITA RENETTA Garcia   10/10/2024  2:00 PM Rozina Perez FNP-C [facility]        Lab Frequency " Next Occurrence   Mammo Digital Screening Bilat w/ Shay Once 10/09/2023   CT Renal Stone Study ABD Pelvis WO Once 03/19/2024   Mammo Digital Screening Bilat w/ Shay Once 04/10/2024   Ambulatory referral/consult to Physical/Occupational Therapy Once 04/17/2024   Ambulatory referral/consult to Orthopedics Once 04/17/2024   CBC Auto Differential Once 10/10/2024   Comprehensive Metabolic Panel Once 10/10/2024   Lipid Panel Once 10/10/2024   TSH Once 10/10/2024   Hemoglobin A1C Once 10/10/2024   Vitamin D Once 10/10/2024

## 2024-07-25 ENCOUNTER — OFFICE VISIT (OUTPATIENT)
Dept: FAMILY MEDICINE | Facility: CLINIC | Age: 54
End: 2024-07-25
Payer: MEDICAID

## 2024-07-25 VITALS
TEMPERATURE: 98 F | WEIGHT: 173 LBS | BODY MASS INDEX: 30.65 KG/M2 | SYSTOLIC BLOOD PRESSURE: 112 MMHG | DIASTOLIC BLOOD PRESSURE: 82 MMHG | OXYGEN SATURATION: 98 % | HEIGHT: 63 IN | HEART RATE: 101 BPM

## 2024-07-25 DIAGNOSIS — J06.9 UPPER RESPIRATORY TRACT INFECTION, UNSPECIFIED TYPE: Primary | ICD-10-CM

## 2024-07-25 DIAGNOSIS — J40 BRONCHITIS: ICD-10-CM

## 2024-07-25 DIAGNOSIS — R05.9 COUGH, UNSPECIFIED TYPE: ICD-10-CM

## 2024-07-25 DIAGNOSIS — K04.7 DENTAL ABSCESS: ICD-10-CM

## 2024-07-25 LAB
CTP QC/QA: YES
CTP QC/QA: YES
FLUAV AG NPH QL: NEGATIVE
FLUBV AG NPH QL: NEGATIVE
SARS-COV-2 AG RESP QL IA.RAPID: NEGATIVE

## 2024-07-25 RX ORDER — PREDNISONE 20 MG/1
TABLET ORAL
Qty: 8 TABLET | Refills: 0 | Status: SHIPPED | OUTPATIENT
Start: 2024-07-25

## 2024-07-25 RX ORDER — PROMETHAZINE HYDROCHLORIDE AND DEXTROMETHORPHAN HYDROBROMIDE 6.25; 15 MG/5ML; MG/5ML
5 SYRUP ORAL EVERY 6 HOURS PRN
Qty: 150 ML | Refills: 0 | Status: SHIPPED | OUTPATIENT
Start: 2024-07-25 | End: 2024-08-04

## 2024-07-25 RX ORDER — ALBUTEROL SULFATE 90 UG/1
2 AEROSOL, METERED RESPIRATORY (INHALATION) EVERY 6 HOURS PRN
Qty: 18 G | Refills: 0 | Status: SHIPPED | OUTPATIENT
Start: 2024-07-25 | End: 2025-07-25

## 2024-07-25 NOTE — PROGRESS NOTES
Patient ID: [id number]     Chief Complaint: Sinusitis and Cough      HPI:     Samanta Pastrana is a 54 y.o. female in the office for Sinusitis and Cough  She is still taking Keflex.  She is still having dental pain and right ear pain.  She is also complaining of a cough.  Her boyfriend was just diagnosed with COVID-19.  For the last week she has been running a low temperature of 100.8°.  Feels ill today.  She denies shortness of breath.  No N/V/D.    Past Medical History:  has a past medical history of Allergies, Anxiety, Arthritis, Bilateral hand pain, Cancer, Carpal tunnel syndrome, Cervical neuropathy, Chronic pain, COVID-19, DDD (degenerative disc disease), cervical, DDD (degenerative disc disease), lumbar, Depression, DUB (dysfunctional uterine bleeding), Fibromyalgia, GERD (gastroesophageal reflux disease), History of lobectomy of thyroid, Hyperlipidemia, Insomnia, Joint pain, Mental disorder, Obesity, unspecified, Renal cyst, Right ureteral stone, Tobacco user, and Vitamin D deficiency.    Social History:  reports that she has been smoking cigarettes. She started smoking about 37 years ago. She has a 18.8 pack-year smoking history. She has been exposed to tobacco smoke. She has never used smokeless tobacco. She reports that she does not currently use alcohol. She reports that she does not use drugs.    Current Outpatient Medications   Medication Instructions    albuterol (PROVENTIL HFA) 90 mcg/actuation inhaler 2 puffs, Inhalation, Every 6 hours PRN, Rescue    albuterol (PROVENTIL/VENTOLIN HFA) 90 mcg/actuation inhaler 2 puffs, Inhalation, Every 6 hours PRN    atorvastatin (LIPITOR) 20 mg, Oral    cephALEXin (KEFLEX) 500 mg, Oral, Every 6 hours    citalopram (CELEXA) 40 mg, Oral, Daily    metoprolol succinate (TOPROL-XL) 25 mg, Oral, Daily    mirtazapine (REMERON) 15 mg, Oral, Nightly    omeprazole (PRILOSEC) 40 mg, Oral, Daily    predniSONE (DELTASONE) 20 MG tablet One tablet orally twice daily for 3 days and  "then once daily for 2 days    promethazine-dextromethorphan (PROMETHAZINE-DM) 6.25-15 mg/5 mL Syrp 5 mLs, Oral, Every 6 hours PRN    tiZANidine (ZANAFLEX) 4 mg, Oral    VITAMIN D2 50,000 Units, Oral, Every 7 days       Patient has No Known Allergies.     Patient Care Team:  Rozina Perez, DAQUAN as PCP - General (Family Medicine)  Chris Vázquez MD (Neurosurgery)  Rui Ortiz MD as Consulting Physician (Neurology)     Subjective     Review of Systems   Respiratory:  Positive for cough.        See HPI     Objective     Visit Vitals  /82 (BP Location: Right arm, Patient Position: Sitting, BP Method: Medium (Manual))   Pulse 101   Temp 97.9 °F (36.6 °C) (Temporal)   Ht 5' 2.99" (1.6 m)   Wt 78.5 kg (173 lb)   LMP  (LMP Unknown)   SpO2 98%   BMI 30.65 kg/m²       Physical Exam  Vitals reviewed.   Constitutional:       Appearance: Normal appearance.   Cardiovascular:      Rate and Rhythm: Normal rate and regular rhythm.   Pulmonary:      Effort: Pulmonary effort is normal. No respiratory distress.      Breath sounds: Wheezing present.   Skin:     General: Skin is warm and dry.   Neurological:      Mental Status: She is alert and oriented to person, place, and time.   Psychiatric:         Mood and Affect: Mood normal.         Assessment & Plan:     1. Upper respiratory tract infection, unspecified type  Assessment & Plan:  Use OTC cold meds for symptoms.  If you have high blood pressure, avoid products with pseudoephedrine.  Use OTC Tylenol or Ibuprofen for fever or body aches.  Get plenty of rest, drink plenty of fluids, eat a healthy diet, avoid alcohol and smoking.  Sore Throat: Use OTC lozenges or throat sprays, gargle with warm salt and water, warm tea with honey.  Nasal Congestion: Use OTC Mucinex D, humidifier or steamy shower.  Cough: Use dextromethorphan/doxylamine Cough Syrup at night.  Report fever greater than 101 F, breathing problems, painful or worsening cough, or changes in mucous.  Cover " your mouth when coughing, avoid sharing cups or lip balm, wash your hands before eating or touching your face.      Orders:  -     predniSONE (DELTASONE) 20 MG tablet; One tablet orally twice daily for 3 days and then once daily for 2 days  Dispense: 8 tablet; Refill: 0  -     promethazine-dextromethorphan (PROMETHAZINE-DM) 6.25-15 mg/5 mL Syrp; Take 5 mLs by mouth every 6 (six) hours as needed (cough).  Dispense: 150 mL; Refill: 0    2. Bronchitis    3. Cough, unspecified type  -     POCT Influenza A/B Rapid Antigen  -     SARS Coronavirus 2 Antigen, POCT Manual Read    4. Dental abscess  Overview:  Augmentin 7/9/24, keflex 7/15/24.    Assessment & Plan:  Follow up with dentist.       Other orders  -     albuterol (PROVENTIL HFA) 90 mcg/actuation inhaler; Inhale 2 puffs into the lungs every 6 (six) hours as needed for Wheezing. Rescue  Dispense: 18 g; Refill: 0       Follow up if symptoms worsen or fail to improve. In addition to their next scheduled appointment, the patient has also been instructed to follow up on as needed basis.     Future Appointments   Date Time Provider Department Center   10/4/2024  8:40 AM LAB, [facility] LABORATORY DRAW STATION [facility] RENETTA Garcia   10/10/2024  2:00 PM Rozina Perez, MARCIAL-WHIT [facility] JOSE M Garcia

## 2024-07-25 NOTE — ASSESSMENT & PLAN NOTE
Use OTC cold meds for symptoms.  If you have high blood pressure, avoid products with pseudoephedrine.  Use OTC Tylenol or Ibuprofen for fever or body aches.  Get plenty of rest, drink plenty of fluids, eat a healthy diet, avoid alcohol and smoking.  Sore Throat: Use OTC lozenges or throat sprays, gargle with warm salt and water, warm tea with honey.  Nasal Congestion: Use OTC Mucinex D, humidifier or steamy shower.  Cough: Use dextromethorphan/doxylamine Cough Syrup at night.  Report fever greater than 101 F, breathing problems, painful or worsening cough, or changes in mucous.  Cover your mouth when coughing, avoid sharing cups or lip balm, wash your hands before eating or touching your face.

## 2024-07-30 ENCOUNTER — TELEPHONE (OUTPATIENT)
Dept: FAMILY MEDICINE | Facility: CLINIC | Age: 54
End: 2024-07-30
Payer: MEDICAID

## 2024-09-12 ENCOUNTER — OFFICE VISIT (OUTPATIENT)
Dept: FAMILY MEDICINE | Facility: CLINIC | Age: 54
End: 2024-09-12
Payer: MEDICAID

## 2024-09-12 VITALS
BODY MASS INDEX: 33.13 KG/M2 | WEIGHT: 180 LBS | SYSTOLIC BLOOD PRESSURE: 110 MMHG | OXYGEN SATURATION: 99 % | DIASTOLIC BLOOD PRESSURE: 72 MMHG | HEART RATE: 86 BPM | TEMPERATURE: 97 F | HEIGHT: 62 IN

## 2024-09-12 DIAGNOSIS — R10.9 RIGHT FLANK PAIN: ICD-10-CM

## 2024-09-12 DIAGNOSIS — R31.29 MICROSCOPIC HEMATURIA: ICD-10-CM

## 2024-09-12 DIAGNOSIS — M50.30 DEGENERATIVE DISC DISEASE, CERVICAL: ICD-10-CM

## 2024-09-12 DIAGNOSIS — M51.36 DEGENERATIVE DISC DISEASE, LUMBAR: ICD-10-CM

## 2024-09-12 DIAGNOSIS — R10.31 RLQ ABDOMINAL PAIN: Primary | ICD-10-CM

## 2024-09-12 DIAGNOSIS — R10.31 RIGHT LOWER QUADRANT ABDOMINAL PAIN: ICD-10-CM

## 2024-09-12 DIAGNOSIS — Z87.442 HISTORY OF KIDNEY STONES: ICD-10-CM

## 2024-09-12 DIAGNOSIS — M51.34 DDD (DEGENERATIVE DISC DISEASE), THORACIC: ICD-10-CM

## 2024-09-12 PROBLEM — M79.602 BILATERAL ARM PAIN: Status: RESOLVED | Noted: 2020-01-13 | Resolved: 2024-09-12

## 2024-09-12 PROBLEM — M54.2 NECK PAIN: Status: RESOLVED | Noted: 2020-01-13 | Resolved: 2024-09-12

## 2024-09-12 PROBLEM — J06.9 UPPER RESPIRATORY TRACT INFECTION: Status: RESOLVED | Noted: 2024-07-25 | Resolved: 2024-09-12

## 2024-09-12 PROBLEM — M79.601 BILATERAL ARM PAIN: Status: RESOLVED | Noted: 2020-01-13 | Resolved: 2024-09-12

## 2024-09-12 PROBLEM — R05.9 COUGH: Status: RESOLVED | Noted: 2024-07-09 | Resolved: 2024-09-12

## 2024-09-12 PROBLEM — M79.643 PAIN OF HAND: Status: RESOLVED | Noted: 2023-03-27 | Resolved: 2024-09-12

## 2024-09-12 LAB
BILIRUB SERPL-MCNC: NEGATIVE MG/DL
BLOOD URINE, POC: NORMAL
CLARITY, POC UA: CLEAR
COLOR, POC UA: YELLOW
GLUCOSE UR QL STRIP: NEGATIVE
KETONES UR QL STRIP: NEGATIVE
LEUKOCYTE ESTERASE URINE, POC: NEGATIVE
NITRITE, POC UA: NEGATIVE
PH, POC UA: 6.5
PROTEIN, POC: NEGATIVE
SPECIFIC GRAVITY, POC UA: 1.01
UROBILINOGEN, POC UA: NORMAL

## 2024-09-12 PROCEDURE — 1159F MED LIST DOCD IN RCRD: CPT | Mod: CPTII,,, | Performed by: NURSE PRACTITIONER

## 2024-09-12 PROCEDURE — 1160F RVW MEDS BY RX/DR IN RCRD: CPT | Mod: CPTII,,, | Performed by: NURSE PRACTITIONER

## 2024-09-12 PROCEDURE — 3008F BODY MASS INDEX DOCD: CPT | Mod: CPTII,,, | Performed by: NURSE PRACTITIONER

## 2024-09-12 PROCEDURE — 99214 OFFICE O/P EST MOD 30 MIN: CPT | Mod: ,,, | Performed by: NURSE PRACTITIONER

## 2024-09-12 PROCEDURE — 81002 URINALYSIS NONAUTO W/O SCOPE: CPT | Mod: ,,, | Performed by: NURSE PRACTITIONER

## 2024-09-12 PROCEDURE — 3074F SYST BP LT 130 MM HG: CPT | Mod: CPTII,,, | Performed by: NURSE PRACTITIONER

## 2024-09-12 PROCEDURE — 3078F DIAST BP <80 MM HG: CPT | Mod: CPTII,,, | Performed by: NURSE PRACTITIONER

## 2024-09-12 RX ORDER — KETOROLAC TROMETHAMINE 30 MG/ML
60 INJECTION, SOLUTION INTRAMUSCULAR; INTRAVENOUS
Status: COMPLETED | OUTPATIENT
Start: 2024-09-12 | End: 2024-09-12

## 2024-09-12 RX ADMIN — KETOROLAC TROMETHAMINE 60 MG: 30 INJECTION, SOLUTION INTRAMUSCULAR; INTRAVENOUS at 02:09

## 2024-09-12 NOTE — PROGRESS NOTES
Patient ID: Samanta Pastrana  : 1970     Chief Complaint: Abdominal Pain and Pelvic Pain    Allergies: Patient has No Known Allergies.     History of Present Illness:  The patient is a 54 y.o. White female who presents to clinic for evaluation and management with a chief complaint of Abdominal Pain and Pelvic Pain   Began 3 weeks ago. Described as right mid back pain that wraps around to right groin. Denies any dysuria or difficulty passing urine. Stays with pain at all times with sometimes not being as bad and other times excruciating pain.      Patient admits to long history of recurrent back pains. Reports that she cannot take oral nsaids d/t GI upset.     Abdominal Pain  This is a recurrent problem. The current episode started 1 to 4 weeks ago. The problem has been waxing and waning. The pain is located in the right flank and RLQ. The pain is severe. The quality of the pain is aching, sharp and a sensation of fullness. Associated symptoms include constipation, diarrhea (x 2 days followed by 2 days contipation, current BM are normal), headaches, myalgias and nausea. Pertinent negatives include no belching, dysuria, fever, flatus, frequency, hematuria, vomiting or weight loss. The pain is aggravated by movement (lifting right arm and right leg). The pain is relieved by Nothing. She has tried acetaminophen for the symptoms. The treatment provided moderate relief.   Pelvic Pain  The patient's primary symptoms include pelvic pain. Associated symptoms include abdominal pain, constipation, diarrhea (x 2 days followed by 2 days contipation, current BM are normal), headaches and nausea. Pertinent negatives include no dysuria, fever, frequency, hematuria or vomiting.        Past Medical History:  has a past medical history of Allergies, Anxiety, Arthritis, Bilateral hand pain, Cancer, Carpal tunnel syndrome, Cervical neuropathy, Chronic pain, COVID-19, DDD (degenerative disc disease), cervical, DDD (degenerative  "disc disease), lumbar, Depression, DUB (dysfunctional uterine bleeding), Fibromyalgia, GERD (gastroesophageal reflux disease), History of lobectomy of thyroid, Hyperlipidemia, Insomnia, Joint pain, Mental disorder, Obesity, unspecified, Renal cyst, Right ureteral stone, Tobacco user, and Vitamin D deficiency.    Social History:  reports that she has been smoking cigarettes. She started smoking about 37 years ago. She has a 18.8 pack-year smoking history. She has been exposed to tobacco smoke. She has never used smokeless tobacco. She reports that she does not currently use alcohol. She reports that she does not use drugs.    Care Team: Patient Care Team:  Rozina Perez FNP-C as PCP - General (Family Medicine)  Chris Vázquez MD (Neurosurgery)  Rui Ortiz MD as Consulting Physician (Neurology)     Current Medications:  Current Outpatient Medications   Medication Instructions    albuterol (PROVENTIL/VENTOLIN HFA) 90 mcg/actuation inhaler 2 puffs, Inhalation, Every 6 hours PRN    atorvastatin (LIPITOR) 20 mg, Oral    citalopram (CELEXA) 40 mg, Oral, Daily    metoprolol succinate (TOPROL-XL) 25 mg, Oral, Daily    mirtazapine (REMERON) 15 mg, Oral, Nightly    omeprazole (PRILOSEC) 40 mg, Oral, Daily    tiZANidine (ZANAFLEX) 4 mg, Oral    VITAMIN D2 50,000 Units, Oral, Every 7 days       Review of Systems   Constitutional:  Negative for fever and weight loss.   Gastrointestinal:  Positive for abdominal pain, constipation, diarrhea (x 2 days followed by 2 days contipation, current BM are normal) and nausea. Negative for flatus and vomiting.   Genitourinary:  Positive for pelvic pain. Negative for dysuria, frequency and hematuria.   Musculoskeletal:  Positive for myalgias.   Neurological:  Positive for headaches.        Visit Vitals  /72 (BP Location: Right arm)   Pulse 86   Temp 97.1 °F (36.2 °C) (Oral)   Ht 5' 2" (1.575 m)   Wt 81.6 kg (180 lb)   LMP  (LMP Unknown)   SpO2 99%   BMI 32.92 kg/m² "       Physical Exam  Constitutional:       Appearance: Normal appearance.   HENT:      Head: Normocephalic and atraumatic.      Nose: Nose normal.      Mouth/Throat:      Mouth: Mucous membranes are moist.      Pharynx: Oropharynx is clear.   Eyes:      Extraocular Movements: Extraocular movements intact.      Conjunctiva/sclera: Conjunctivae normal.      Pupils: Pupils are equal, round, and reactive to light.   Cardiovascular:      Rate and Rhythm: Normal rate and regular rhythm.   Pulmonary:      Effort: Pulmonary effort is normal.      Breath sounds: Normal breath sounds.   Abdominal:      General: Bowel sounds are normal.      Palpations: Abdomen is soft.      Tenderness: There is abdominal tenderness in the right lower quadrant and suprapubic area. There is right CVA tenderness.   Musculoskeletal:      Lumbar back: Decreased range of motion (guarded).   Skin:     General: Skin is warm.      Capillary Refill: Capillary refill takes less than 2 seconds.   Neurological:      Mental Status: She is alert.   Psychiatric:         Mood and Affect: Mood normal.         Behavior: Behavior normal.         Judgment: Judgment normal.        Labs Reviewed:  Chemistry:  Lab Results   Component Value Date     04/02/2024    K 4.1 04/02/2024    BUN 8.0 04/02/2024    CREATININE 0.67 04/02/2024    EGFRNORACEVR >90 04/02/2024    GLUCOSE 125 (H) 04/02/2024    CALCIUM 9.5 04/02/2024    ALKPHOS 77 08/16/2023    LABPROT 6.1 (L) 08/16/2023    ALBUMIN 4.0 08/16/2023    BILIDIR 0.00 11/23/2019    IBILI 0.20 11/23/2019    AST 38 (H) 08/16/2023    ALT 29 08/16/2023    DGHYBCNK00WI 48.5 08/16/2023    TSH 3.310 08/16/2023    QFBGBX2XYMP 1.12 08/16/2023        Lab Results   Component Value Date    HGBA1C 5.5 08/16/2023        Hematology:  Lab Results   Component Value Date    WBC 7.02 04/02/2024    RBC 4.26 04/02/2024    HGB 13.3 04/02/2024    HCT 37.9 04/02/2024    MCV 89.0 04/02/2024    MCH 31.2 04/02/2024    MCHC 35.1 04/02/2024     RDW 12.9 04/02/2024     (H) 04/02/2024    MPV 10.0 04/02/2024       Lipid Panel:  Lab Results   Component Value Date    CHOL 139 08/16/2023    HDL 31 (L) 08/16/2023    LDLDIRECT 62.6 08/16/2023    TRIG 328 (H) 08/16/2023        Assessment & Plan:  1. RLQ abdominal pain  Comments:  obtain CT abd/pelvis, difficulty with oral nsaids, toradol given in office , encouraged hydration  Orders:  -     POCT urine dipstick without microscope  -     ketorolac injection 60 mg    2. Right flank pain  -     POCT urine dipstick without microscope  -     ketorolac injection 60 mg    3. Degenerative disc disease, cervical    4. Degenerative disc disease, lumbar    5. DDD (degenerative disc disease), thoracic    6. Right lower quadrant abdominal pain  -     CT Renal Stone Study ABD Pelvis WO; Future; Expected date: 09/12/2024    7. Microscopic hematuria  -     CT Renal Stone Study ABD Pelvis WO; Future; Expected date: 09/12/2024    8. History of kidney stones  -     CT Renal Stone Study ABD Pelvis WO; Future; Expected date: 09/12/2024         Future Appointments   Date Time Provider Department Center   10/4/2024  8:40 AM LAB, [facility] LABORATORY DRAW STATION [facility] RENETTA Garcia   10/10/2024  2:00 PM [facility]       Follow up if symptoms worsen or fail to improve. Call sooner if needed.    ARIELLE BARRIOS        Lab Frequency Next Occurrence   Mammo Digital Screening Bilat w/ Shay Once 10/09/2023   CT Renal Stone Study ABD Pelvis WO Once 03/19/2024   Mammo Digital Screening Bilat w/ Shay Once 04/10/2024   Ambulatory referral/consult to Physical/Occupational Therapy Once 04/17/2024   Ambulatory referral/consult to Orthopedics Once 04/17/2024   CBC Auto Differential Once 10/10/2024   Comprehensive Metabolic Panel Once 10/10/2024   Lipid Panel Once 10/10/2024   TSH Once 10/10/2024   Hemoglobin A1C Once 10/10/2024   Vitamin D Once 10/10/2024

## 2024-09-19 ENCOUNTER — HOSPITAL ENCOUNTER (OUTPATIENT)
Dept: RADIOLOGY | Facility: HOSPITAL | Age: 54
Discharge: HOME | End: 2024-09-19
Attending: NURSE PRACTITIONER
Payer: MEDICAID

## 2024-09-19 DIAGNOSIS — R10.31 RIGHT LOWER QUADRANT ABDOMINAL PAIN: ICD-10-CM

## 2024-09-19 DIAGNOSIS — R31.29 MICROSCOPIC HEMATURIA: ICD-10-CM

## 2024-09-19 DIAGNOSIS — Z87.442 HISTORY OF KIDNEY STONES: ICD-10-CM

## 2024-09-19 DIAGNOSIS — Z12.31 SCREENING MAMMOGRAM FOR BREAST CANCER: ICD-10-CM

## 2024-09-19 PROCEDURE — 77067 SCR MAMMO BI INCL CAD: CPT | Mod: TC

## 2024-09-19 PROCEDURE — 74176 CT ABD & PELVIS W/O CONTRAST: CPT | Mod: TC

## 2024-09-19 NOTE — PROGRESS NOTES
Notify patient test results are normal. No stones.  No further orders. Keep next appointment as scheduled.

## 2024-09-20 ENCOUNTER — HOSPITAL ENCOUNTER (EMERGENCY)
Facility: HOSPITAL | Age: 54
Discharge: HOME | End: 2024-09-20
Attending: EMERGENCY MEDICINE
Payer: MEDICAID

## 2024-09-20 VITALS
OXYGEN SATURATION: 96 % | WEIGHT: 180 LBS | BODY MASS INDEX: 30.73 KG/M2 | RESPIRATION RATE: 18 BRPM | DIASTOLIC BLOOD PRESSURE: 74 MMHG | HEART RATE: 81 BPM | TEMPERATURE: 98 F | HEIGHT: 64 IN | SYSTOLIC BLOOD PRESSURE: 118 MMHG

## 2024-09-20 DIAGNOSIS — M54.50 ACUTE RIGHT-SIDED LOW BACK PAIN, UNSPECIFIED WHETHER SCIATICA PRESENT: Primary | ICD-10-CM

## 2024-09-20 DIAGNOSIS — R10.13 EPIGASTRIC ABDOMINAL PAIN: ICD-10-CM

## 2024-09-20 LAB
ALBUMIN SERPL-MCNC: 4.6 G/DL (ref 3.4–5)
ALBUMIN/GLOB SERPL: 2 RATIO
ALP SERPL-CCNC: 69 UNIT/L (ref 50–144)
ALT SERPL-CCNC: 41 UNIT/L (ref 1–45)
ANION GAP SERPL CALC-SCNC: 5 MEQ/L (ref 2–13)
AST SERPL-CCNC: 37 UNIT/L (ref 14–36)
BACTERIA #/AREA URNS AUTO: NORMAL /HPF
BASOPHILS # BLD AUTO: 0.05 X10(3)/MCL (ref 0.01–0.08)
BASOPHILS NFR BLD AUTO: 0.6 % (ref 0.1–1.2)
BILIRUB SERPL-MCNC: 0.2 MG/DL (ref 0–1)
BILIRUB UR QL STRIP.AUTO: NEGATIVE
BUN SERPL-MCNC: 7 MG/DL (ref 7–20)
CALCIUM SERPL-MCNC: 9.8 MG/DL (ref 8.4–10.2)
CHLORIDE SERPL-SCNC: 107 MMOL/L (ref 98–110)
CLARITY UR: CLEAR
CO2 SERPL-SCNC: 28 MMOL/L (ref 21–32)
COLOR UR AUTO: YELLOW
CREAT SERPL-MCNC: 0.76 MG/DL (ref 0.66–1.25)
CREAT/UREA NIT SERPL: 9 (ref 12–20)
EOSINOPHIL # BLD AUTO: 0.15 X10(3)/MCL (ref 0.04–0.36)
EOSINOPHIL NFR BLD AUTO: 1.9 % (ref 0.7–7)
ERYTHROCYTE [DISTWIDTH] IN BLOOD BY AUTOMATED COUNT: 12.8 % (ref 11–14.5)
GFR SERPLBLD CREATININE-BSD FMLA CKD-EPI: >90 ML/MIN/1.73/M2
GLOBULIN SER-MCNC: 2.3 GM/DL (ref 2–3.9)
GLUCOSE SERPL-MCNC: 93 MG/DL (ref 70–115)
GLUCOSE UR QL STRIP: NEGATIVE
HCT VFR BLD AUTO: 37.2 % (ref 36–48)
HGB BLD-MCNC: 13.1 G/DL (ref 11.8–16)
HGB UR QL STRIP: ABNORMAL
IMM GRANULOCYTES # BLD AUTO: 0.02 X10(3)/MCL (ref 0–0.03)
IMM GRANULOCYTES NFR BLD AUTO: 0.3 % (ref 0–0.5)
KETONES UR QL STRIP: NEGATIVE
LACTATE SERPL-SCNC: 0.7 MMOL/L (ref 0.4–2)
LEUKOCYTE ESTERASE UR QL STRIP: NEGATIVE
LIPASE SERPL-CCNC: 77 U/L (ref 23–300)
LYMPHOCYTES # BLD AUTO: 3.16 X10(3)/MCL (ref 1.16–3.74)
LYMPHOCYTES NFR BLD AUTO: 40.9 % (ref 20–55)
MCH RBC QN AUTO: 31.4 PG (ref 27–34)
MCHC RBC AUTO-ENTMCNC: 35.2 G/DL (ref 31–37)
MCV RBC AUTO: 89.2 FL (ref 79–99)
MONOCYTES # BLD AUTO: 0.41 X10(3)/MCL (ref 0.24–0.36)
MONOCYTES NFR BLD AUTO: 5.3 % (ref 4.7–12.5)
NEUTROPHILS # BLD AUTO: 3.94 X10(3)/MCL (ref 1.56–6.13)
NEUTROPHILS NFR BLD AUTO: 51 % (ref 37–73)
NITRITE UR QL STRIP: NEGATIVE
NRBC BLD AUTO-RTO: 0 %
PH UR STRIP: 6 [PH]
PLATELET # BLD AUTO: 376 X10(3)/MCL (ref 140–371)
PMV BLD AUTO: 9.1 FL (ref 9.4–12.4)
POTASSIUM SERPL-SCNC: 3.5 MMOL/L (ref 3.5–5.1)
PROT SERPL-MCNC: 6.9 GM/DL (ref 6.3–8.2)
PROT UR QL STRIP: NEGATIVE
RBC # BLD AUTO: 4.17 X10(6)/MCL (ref 4–5.1)
RBC #/AREA URNS AUTO: NORMAL /HPF
SODIUM SERPL-SCNC: 140 MMOL/L (ref 136–145)
SP GR UR STRIP.AUTO: 1.01 (ref 1–1.03)
SQUAMOUS #/AREA URNS AUTO: NORMAL /HPF
TROPONIN I SERPL-MCNC: <0.012 NG/ML (ref 0–0.03)
UROBILINOGEN UR STRIP-ACNC: 0.2
WBC # BLD AUTO: 7.73 X10(3)/MCL (ref 4–11.5)
WBC #/AREA URNS AUTO: NORMAL /HPF

## 2024-09-20 PROCEDURE — 81015 MICROSCOPIC EXAM OF URINE: CPT | Performed by: EMERGENCY MEDICINE

## 2024-09-20 PROCEDURE — 81003 URINALYSIS AUTO W/O SCOPE: CPT | Performed by: EMERGENCY MEDICINE

## 2024-09-20 PROCEDURE — 83605 ASSAY OF LACTIC ACID: CPT | Performed by: EMERGENCY MEDICINE

## 2024-09-20 PROCEDURE — 96375 TX/PRO/DX INJ NEW DRUG ADDON: CPT

## 2024-09-20 PROCEDURE — 93005 ELECTROCARDIOGRAM TRACING: CPT

## 2024-09-20 PROCEDURE — 96374 THER/PROPH/DIAG INJ IV PUSH: CPT

## 2024-09-20 PROCEDURE — 85025 COMPLETE CBC W/AUTO DIFF WBC: CPT | Performed by: EMERGENCY MEDICINE

## 2024-09-20 PROCEDURE — 80053 COMPREHEN METABOLIC PANEL: CPT | Performed by: EMERGENCY MEDICINE

## 2024-09-20 PROCEDURE — 96361 HYDRATE IV INFUSION ADD-ON: CPT

## 2024-09-20 PROCEDURE — 83690 ASSAY OF LIPASE: CPT | Performed by: EMERGENCY MEDICINE

## 2024-09-20 PROCEDURE — 84484 ASSAY OF TROPONIN QUANT: CPT | Performed by: EMERGENCY MEDICINE

## 2024-09-20 PROCEDURE — 63600175 PHARM REV CODE 636 W HCPCS: Performed by: EMERGENCY MEDICINE

## 2024-09-20 PROCEDURE — 99285 EMERGENCY DEPT VISIT HI MDM: CPT | Mod: 25

## 2024-09-20 PROCEDURE — 93010 ELECTROCARDIOGRAM REPORT: CPT | Mod: ,,, | Performed by: INTERNAL MEDICINE

## 2024-09-20 RX ORDER — METHOCARBAMOL 500 MG/1
1000 TABLET, FILM COATED ORAL 3 TIMES DAILY
Qty: 30 TABLET | Refills: 0 | Status: SHIPPED | OUTPATIENT
Start: 2024-09-20 | End: 2024-09-25

## 2024-09-20 RX ORDER — ONDANSETRON HYDROCHLORIDE 2 MG/ML
4 INJECTION, SOLUTION INTRAVENOUS
Status: COMPLETED | OUTPATIENT
Start: 2024-09-20 | End: 2024-09-20

## 2024-09-20 RX ORDER — KETOROLAC TROMETHAMINE 30 MG/ML
30 INJECTION, SOLUTION INTRAMUSCULAR; INTRAVENOUS
Status: COMPLETED | OUTPATIENT
Start: 2024-09-20 | End: 2024-09-20

## 2024-09-20 RX ORDER — NAPROXEN 500 MG/1
500 TABLET ORAL 2 TIMES DAILY WITH MEALS
Qty: 15 TABLET | Refills: 0 | Status: SHIPPED | OUTPATIENT
Start: 2024-09-20

## 2024-09-20 RX ADMIN — KETOROLAC TROMETHAMINE 30 MG: 30 INJECTION, SOLUTION INTRAMUSCULAR at 03:09

## 2024-09-20 RX ADMIN — ONDANSETRON 4 MG: 2 INJECTION INTRAMUSCULAR; INTRAVENOUS at 03:09

## 2024-09-20 RX ADMIN — SODIUM CHLORIDE, POTASSIUM CHLORIDE, SODIUM LACTATE AND CALCIUM CHLORIDE 1000 ML: 600; 310; 30; 20 INJECTION, SOLUTION INTRAVENOUS at 03:09

## 2024-09-20 NOTE — DISCHARGE INSTRUCTIONS
RETURN TO EMERGENCY DEPARTMENT WITHOUT FAIL, IF YOUR SYMPTOMS WORSEN, IF YOU GET NEW OR DIFFERENT SYMPTOMS, IF YOU ARE UNABLE TO FOLLOW UP AS DIRECTED, OR IF YOU HAVE ANY CONCERNS OR WORRIES.    Take medication as directed.  Follow up with the primary care provider for further evaluation of symptoms.

## 2024-09-20 NOTE — ED PROVIDER NOTES
Encounter Date: 9/20/2024       History     Chief Complaint   Patient presents with    Abdominal Pain    Nausea     Pt presented to ED per POV with c/o abdominal pain with nausea onset 3 weeks ago, but has gotten worse today. Pt reports CT scan was done by PCP and she was given Toradol, but the pain is still there.      54-year-old female with past medical history of chronic back pain, obesity, hyperlipidemia, depression, anxiety, cervical radiculopathy, fibromyalgia, presents emergency department with right low back pain right flank pain, abdominal pain.  This has been present for 3-4 weeks.  It is worse when the patient changes positions.  Worse when she moves her sometimes reaches for things.  Does not shoot down her leg.  Starts from her low back and flank region sometimes radiates around into her abdomen on the right side.  No bowel or bladder incontinence.  No urinary symptoms such as frequency urgency or burning.  No vomiting, she did have some diarrhea which is now resolved.  She denies any chest pain or any shortness of breath.  She  Patient saw her primary care provider ordered a CT scan yesterday did checked for kidney stone and it was negative.  Patient also had a normal appendix on the CT scan which was performed yesterday.      Review of patient's allergies indicates:  No Known Allergies  Past Medical History:   Diagnosis Date    Allergies     Anxiety     Arthritis     Bilateral hand pain     Cancer     Carpal tunnel syndrome     Cervical neuropathy     Chronic pain     COVID-19     DDD (degenerative disc disease), cervical     DDD (degenerative disc disease), lumbar     Depression     DUB (dysfunctional uterine bleeding)     Fibromyalgia     GERD (gastroesophageal reflux disease)     History of lobectomy of thyroid     Hyperlipidemia     Insomnia     Joint pain     Mental disorder     Obesity, unspecified     Renal cyst     Right ureteral stone     Tobacco user     Vitamin D deficiency      Past  Surgical History:   Procedure Laterality Date    BREAST SURGERY  Dont remember    CERVICAL BIOPSY  W/ LOOP ELECTRODE EXCISION  2013 2010    CERVICAL FUSION      COLONOSCOPY  03/07/2017    Dr Sal Maynard    COLONOSCOPY  08/08/2019    excision of breast lump  2011    excision of face skin  01/25/2018    EXCISION OF LESION OF EYELID  01/25/2018    EYE SURGERY  2019    NECK SURGERY      TUBAL LIGATION       Family History   Problem Relation Name Age of Onset    Cancer Mother Sujey Reyna     Diabetes Mother Sujey Reyna     Heart disease Mother Sujey Reyna     Bipolar disorder Mother Sujey Reyna     COPD Mother Sujey Reyna     Heart failure Mother Sujey Reyna     Hypertension Mother Sujey Reyna     Mental illness Mother Sujey Reyna     Cancer Father Gustavo Reyna     Cataracts Father Gustavo Reyna     Graves' disease Sister Migdalia LeBLeu     Hypertension Sister Migdaliachapito Lambertu     Hyperthyroidism Sister Migdaliachapito Lambertu     Migraines Sister Migdaliachapito Lambertu     Thyroid cancer Sister Migdalia Lambertu     COPD Sister Migdalia Lambertu     Bipolar disorder Brother Isaías Axel     COPD Brother Isaías Axel     Post-traumatic stress disorder Brother Isaías Axel     Schizophrenia Brother Isaías Axel     Heart disease Brother Isaías Axel     No Known Problems Maternal Aunt      No Known Problems Maternal Uncle      No Known Problems Paternal Aunt      No Known Problems Paternal Uncle      No Known Problems Maternal Grandmother      No Known Problems Maternal Grandfather      No Known Problems Paternal Grandmother      No Known Problems Paternal Grandfather      Diabetes Sister Madelyn Christopher     Hypertension Sister Madelyn Christopher     Diabetes Brother Gustavo Reyna     Drug abuse Brother Ganga Axel     Mental illness Brother Ganga Axel     Kidney disease Brother Emma Axel     Stroke Sister Lizette Reyna Florence     Aneurysm Neg Hx      Clotting disorder Neg Hx      Dementia Neg Hx      Fainting Neg Hx      Hyperlipidemia  Neg Hx      Liver disease Neg Hx      Neuropathy Neg Hx      Obesity Neg Hx      Parkinsonism Neg Hx      Seizures Neg Hx      Tremor Neg Hx       Social History     Tobacco Use    Smoking status: Every Day     Current packs/day: 0.50     Average packs/day: 0.5 packs/day for 37.7 years (18.9 ttl pk-yrs)     Types: Cigarettes     Start date: 1987     Passive exposure: Current    Smokeless tobacco: Never   Substance Use Topics    Alcohol use: Not Currently    Drug use: Never     Review of Systems   Constitutional:  Negative for chills and fever.   HENT:  Negative for sore throat.    Respiratory:  Negative for shortness of breath.    Cardiovascular:  Negative for chest pain and palpitations.   Gastrointestinal:  Positive for abdominal pain. Negative for nausea and vomiting.   Genitourinary:  Negative for dysuria and flank pain.   Musculoskeletal:  Positive for back pain. Negative for neck pain.   Skin:  Negative for rash.   Neurological:  Negative for weakness and headaches.   Hematological:  Does not bruise/bleed easily.   All other systems reviewed and are negative.      Physical Exam     Initial Vitals [09/20/24 1311]   BP Pulse Resp Temp SpO2   112/77 92 18 97.7 °F (36.5 °C) 99 %      MAP       --         Physical Exam    Nursing note and vitals reviewed.  Constitutional: She appears well-developed and well-nourished. She is Obese . No distress.   HENT:   Head: Normocephalic and atraumatic.   Mouth/Throat: No oropharyngeal exudate.   Eyes: Conjunctivae and EOM are normal. Pupils are equal, round, and reactive to light.   Neck: Neck supple. No tracheal deviation present.   Normal range of motion.  Cardiovascular:  Normal rate, regular rhythm, normal heart sounds and intact distal pulses.           No murmur heard.  Pulmonary/Chest: Breath sounds normal. No stridor. No respiratory distress. She has no wheezes. She has no rhonchi. She has no rales.   Abdominal: Abdomen is soft. She exhibits no distension. There is no  abdominal tenderness. There is no rebound and no guarding.   Musculoskeletal:         General: No tenderness or edema. Normal range of motion.      Cervical back: Normal range of motion and neck supple.      Comments: Low back:  No tenderness to palpation on the lumbar spine, lower muscular tenderness on the right side of the low back, lower right-sided CVA tenderness.    Negative straight leg test bilaterally.         Neurological: She is alert and oriented to person, place, and time. She has normal strength. No cranial nerve deficit or sensory deficit.   Skin: Skin is warm and dry. Capillary refill takes less than 2 seconds. No rash noted. No erythema. No pallor.   Psychiatric: She has a normal mood and affect. Her behavior is normal. Judgment and thought content normal.         ED Course   Procedures  Labs Reviewed   URINALYSIS, REFLEX TO URINE CULTURE - Abnormal       Result Value    Color, UA Yellow      Appearance, UA Clear      Specific Gravity, UA 1.015      pH, UA 6.0      Protein, UA Negative      Glucose, UA Negative      Ketones, UA Negative      Blood, UA Trace-Intact (*)     Bilirubin, UA Negative      Urobilinogen, UA 0.2      Nitrites, UA Negative      Leukocyte Esterase, UA Negative      Narrative:      URINE STABILITY IS 2 HOURS AT ROOM TEMP OR    SIX HOURS REFRIGERATED. PERFORMING TESTING ON    SPECIMENS GREATER THAN THIS AGE MAY AFFECT THE    FOLLOWING TESTS:    PH          SPECIFIC GRAVITY           BLOOD    CLARITY     BILIRUBIN               UROBILINOGEN   COMPREHENSIVE METABOLIC PANEL - Abnormal    Sodium 140      Potassium 3.5      Chloride 107      CO2 28      Glucose 93      Blood Urea Nitrogen 7      Creatinine 0.76      Calcium 9.8      Protein Total 6.9      Albumin 4.6      Globulin 2.3      Albumin/Globulin Ratio 2.0      Bilirubin Total 0.2      ALP 69      ALT 41      AST 37 (*)     eGFR >90      Anion Gap 5.0      BUN/Creatinine Ratio 9 (*)    CBC WITH DIFFERENTIAL - Abnormal     WBC 7.73      RBC 4.17      Hgb 13.1      Hct 37.2      MCV 89.2      MCH 31.4      MCHC 35.2      RDW 12.8      Platelet 376 (*)     MPV 9.1 (*)     Neut % 51.0      Lymph % 40.9      Mono % 5.3      Eos % 1.9      Basophil % 0.6      Lymph # 3.16      Neut # 3.94      Mono # 0.41 (*)     Eos # 0.15      Baso # 0.05      IG# 0.02      IG% 0.3      NRBC% 0.0     URINALYSIS, MICROSCOPIC - Normal    Bacteria, UA None Seen      RBC, UA 0-5      WBC, UA 0-2      Squamous Epithelial Cells, UA Rare     LACTIC ACID, PLASMA - Normal    Lactic Acid Level 0.7     LIPASE - Normal    Lipase Level 77     TROPONIN I - Normal    Troponin-I <0.012     CBC W/ AUTO DIFFERENTIAL    Narrative:     The following orders were created for panel order CBC auto differential.  Procedure                               Abnormality         Status                     ---------                               -----------         ------                     CBC with Differential[[id number]]       Abnormal            Final result                 Please view results for these tests on the individual orders.          Imaging Results              US Abdomen Limited (In process)                      Medications   lactated ringers bolus 1,000 mL (0 mLs Intravenous Stopped 9/20/24 1614)   ondansetron injection 4 mg (4 mg Intravenous Given 9/20/24 1514)   ketorolac injection 30 mg (30 mg Intravenous Given 9/20/24 1514)     Medical Decision Making  Differential includes but not limited to pyelonephritis, ureterolithiasis, appendicitis, low back pain, radiculopathy,     Emergent evaluation of a 54-year-old female presents emergency department with back pain abdominal pain as mentioned above.  Overall impression is that she likely has back pain causing her symptoms that it is radiating around into her abdomen.  I considered gallbladder pathology but gallbladder ultrasound normal.  Labs are unremarkable.  White count negative.  LFTs normal.  Patient had a CT scan  yesterday which showed a normal appendix and I do not suspect acute appendicitis on today's emergency department evaluation.  I do not suspect any acute life-threatening abdominal surgical emergency.  Patient has had improvement after Toradol in the emergency department and Zofran.  It is feeling somewhat better.  It has been present for 3-4 weeks with pain.  I recommend continued outpatient follow up.  May need HIDA scan/further evaluation on an outpatient basis.    A dictation software program was used for this note.  Please expect some simple typographical  errors in this note.    I had a detailed discussion with the patient and/or guardian regarding: The historical points, exam findings, and diagnostic results supporting the discharge diagnosis, lab results, pertinent radiology results, and the need for outpatient follow-up, for definitive care with a family practitioner and to return to the emergency department if symptoms worsen or persist or if there are any questions or concerns that arise at home. All questions have been answered in detail. Strict return to Emergency Department precautions have been provided       Amount and/or Complexity of Data Reviewed  External Data Reviewed: labs and notes.  Labs: ordered. Decision-making details documented in ED Course.  Radiology: ordered and independent interpretation performed. Decision-making details documented in ED Course.  ECG/medicine tests: ordered and independent interpretation performed. Decision-making details documented in ED Course.    Risk  OTC drugs.  Prescription drug management.  Decision regarding hospitalization.               ED Course as of 09/20/24 1659   Fri Sep 20, 2024   1522 EKG 3:08 p.m. normal sinus rhythm rate of 81 low voltage QRS.  No ST elevation or depression.  No STEMI.  EKG interpreted independently by me. [JR]   1621 Patient reassess, says she is feeling somewhat better.  Awaiting ultrasound results. [JR]      ED Course User  Index  [JR] Freddy Stanton DO                           Clinical Impression:  Final diagnoses:  [R10.13] Epigastric abdominal pain  [M54.50] Acute right-sided low back pain, unspecified whether sciatica present (Primary)          ED Disposition Condition    Discharge Stable          ED Prescriptions       Medication Sig Dispense Start Date End Date Auth. Provider    naproxen (NAPROSYN) 500 MG tablet Take 1 tablet (500 mg total) by mouth 2 (two) times daily with meals. 15 tablet 9/20/2024 -- Freddy Stanton DO    methocarbamoL (ROBAXIN) 500 MG Tab Take 2 tablets (1,000 mg total) by mouth 3 (three) times daily. for 5 days 30 tablet 9/20/2024 9/25/2024 Freddy Stanton DO          Follow-up Information       Follow up With Specialties Details Why Contact Info    Rozina Perez, FNP-C Family Medicine In 3 days  [address]  [address]  [facility]  [facility] [address]  [phone]      [facility] Emergency Medicine  If symptoms worsen [address]  [facility] [id number] [phone]             Freddy Stanton DO  09/20/24 8071

## 2024-09-23 ENCOUNTER — TELEPHONE (OUTPATIENT)
Dept: FAMILY MEDICINE | Facility: CLINIC | Age: 54
End: 2024-09-23
Payer: MEDICAID

## 2024-09-23 DIAGNOSIS — E55.9 VITAMIN D DEFICIENCY: ICD-10-CM

## 2024-09-23 RX ORDER — ERGOCALCIFEROL 1.25 1/1
50000 CAPSULE ORAL
Qty: 5 CAPSULE | Refills: 6 | Status: SHIPPED | OUTPATIENT
Start: 2024-09-23

## 2024-09-23 NOTE — TELEPHONE ENCOUNTER
----- Message from ARIELLE Conroy sent at 9/23/2024  4:14 PM CDT -----  Normal MMG. Repeat in 1 year.

## 2024-09-24 LAB
OHS QRS DURATION: 60 MS
OHS QTC CALCULATION: 476 MS

## 2024-09-25 ENCOUNTER — PATIENT MESSAGE (OUTPATIENT)
Dept: FAMILY MEDICINE | Facility: CLINIC | Age: 54
End: 2024-09-25
Payer: MEDICAID

## 2024-10-04 PROCEDURE — 84443 ASSAY THYROID STIM HORMONE: CPT | Performed by: NURSE PRACTITIONER

## 2024-10-04 PROCEDURE — 85025 COMPLETE CBC W/AUTO DIFF WBC: CPT | Performed by: NURSE PRACTITIONER

## 2024-10-04 PROCEDURE — 83036 HEMOGLOBIN GLYCOSYLATED A1C: CPT | Performed by: NURSE PRACTITIONER

## 2024-10-04 PROCEDURE — 80061 LIPID PANEL: CPT | Performed by: NURSE PRACTITIONER

## 2024-10-04 PROCEDURE — 80053 COMPREHEN METABOLIC PANEL: CPT | Performed by: NURSE PRACTITIONER

## 2024-10-04 PROCEDURE — 82306 VITAMIN D 25 HYDROXY: CPT | Performed by: NURSE PRACTITIONER

## 2024-11-14 ENCOUNTER — TELEPHONE (OUTPATIENT)
Dept: FAMILY MEDICINE | Facility: CLINIC | Age: 54
End: 2024-11-14
Payer: COMMERCIAL

## 2024-11-14 DIAGNOSIS — K21.9 GASTROESOPHAGEAL REFLUX DISEASE, UNSPECIFIED WHETHER ESOPHAGITIS PRESENT: ICD-10-CM

## 2024-11-14 RX ORDER — OMEPRAZOLE 40 MG/1
40 CAPSULE, DELAYED RELEASE ORAL
Qty: 30 CAPSULE | Refills: 5 | Status: SHIPPED | OUTPATIENT
Start: 2024-11-14

## 2024-11-14 NOTE — TELEPHONE ENCOUNTER
Whenever Rozina returns is fine, she just needs to be seen, she does not have any follow ups or anything scheduled.

## 2024-11-14 NOTE — TELEPHONE ENCOUNTER
I sent her refills for the requested medication, but she has not been seen since July, she will need to be seen for a medication follow up, whenever available.

## 2024-12-26 ENCOUNTER — OFFICE VISIT (OUTPATIENT)
Dept: FAMILY MEDICINE | Facility: CLINIC | Age: 54
End: 2024-12-26
Payer: COMMERCIAL

## 2024-12-26 ENCOUNTER — HOSPITAL ENCOUNTER (OUTPATIENT)
Dept: RADIOLOGY | Facility: HOSPITAL | Age: 54
Discharge: HOME | End: 2024-12-26
Attending: NURSE PRACTITIONER
Payer: COMMERCIAL

## 2024-12-26 ENCOUNTER — TELEPHONE (OUTPATIENT)
Dept: FAMILY MEDICINE | Facility: CLINIC | Age: 54
End: 2024-12-26

## 2024-12-26 VITALS
SYSTOLIC BLOOD PRESSURE: 116 MMHG | OXYGEN SATURATION: 98 % | DIASTOLIC BLOOD PRESSURE: 70 MMHG | WEIGHT: 180.38 LBS | HEART RATE: 89 BPM | BODY MASS INDEX: 30.8 KG/M2 | TEMPERATURE: 97 F | HEIGHT: 64 IN

## 2024-12-26 DIAGNOSIS — R22.1 LOCALIZED SWELLING, MASS AND LUMP, NECK: Primary | ICD-10-CM

## 2024-12-26 DIAGNOSIS — R22.1 LOCALIZED SWELLING, MASS AND LUMP, NECK: ICD-10-CM

## 2024-12-26 LAB
ANION GAP SERPL CALC-SCNC: 4 MEQ/L (ref 2–13)
BASOPHILS # BLD AUTO: 0.06 X10(3)/MCL (ref 0.01–0.08)
BASOPHILS NFR BLD AUTO: 0.8 % (ref 0.1–1.2)
BUN SERPL-MCNC: 7 MG/DL (ref 7–20)
CALCIUM SERPL-MCNC: 9.8 MG/DL (ref 8.4–10.2)
CHLORIDE SERPL-SCNC: 108 MMOL/L (ref 98–110)
CO2 SERPL-SCNC: 28 MMOL/L (ref 21–32)
CREAT SERPL-MCNC: 0.8 MG/DL (ref 0.66–1.25)
CREAT/UREA NIT SERPL: 9 (ref 12–20)
CTP QC/QA: YES
EOSINOPHIL # BLD AUTO: 0.27 X10(3)/MCL (ref 0.04–0.36)
EOSINOPHIL NFR BLD AUTO: 3.4 % (ref 0.7–7)
ERYTHROCYTE [DISTWIDTH] IN BLOOD BY AUTOMATED COUNT: 12.3 % (ref 11–14.5)
GFR SERPLBLD CREATININE-BSD FMLA CKD-EPI: 88 ML/MIN/1.73/M2
GLUCOSE SERPL-MCNC: 92 MG/DL (ref 70–115)
HCT VFR BLD AUTO: 38.9 % (ref 36–48)
HGB BLD-MCNC: 13.5 G/DL (ref 11.8–16)
IMM GRANULOCYTES # BLD AUTO: 0.01 X10(3)/MCL (ref 0–0.03)
IMM GRANULOCYTES NFR BLD AUTO: 0.1 % (ref 0–0.5)
LYMPHOCYTES # BLD AUTO: 3.5 X10(3)/MCL (ref 1.16–3.74)
LYMPHOCYTES NFR BLD AUTO: 43.9 % (ref 20–55)
MCH RBC QN AUTO: 30.6 PG (ref 27–34)
MCHC RBC AUTO-ENTMCNC: 34.7 G/DL (ref 31–37)
MCV RBC AUTO: 88.2 FL (ref 79–99)
MONOCYTES # BLD AUTO: 0.4 X10(3)/MCL (ref 0.24–0.36)
MONOCYTES NFR BLD AUTO: 5 % (ref 4.7–12.5)
NEUTROPHILS # BLD AUTO: 3.74 X10(3)/MCL (ref 1.56–6.13)
NEUTROPHILS NFR BLD AUTO: 46.8 % (ref 37–73)
NRBC BLD AUTO-RTO: 0 %
PLATELET # BLD AUTO: 422 X10(3)/MCL (ref 140–371)
PMV BLD AUTO: 9.4 FL (ref 9.4–12.4)
POTASSIUM SERPL-SCNC: 4.4 MMOL/L (ref 3.5–5.1)
RBC # BLD AUTO: 4.41 X10(6)/MCL (ref 4–5.1)
S PYO RRNA THROAT QL PROBE: NEGATIVE
SODIUM SERPL-SCNC: 140 MMOL/L (ref 136–145)
WBC # BLD AUTO: 7.98 X10(3)/MCL (ref 4–11.5)

## 2024-12-26 PROCEDURE — 76536 US EXAM OF HEAD AND NECK: CPT | Mod: TC

## 2024-12-26 RX ORDER — AMOXICILLIN AND CLAVULANATE POTASSIUM 875; 125 MG/1; MG/1
1 TABLET, FILM COATED ORAL EVERY 12 HOURS
Qty: 20 TABLET | Refills: 0 | Status: SHIPPED | OUTPATIENT
Start: 2024-12-26 | End: 2025-01-05

## 2024-12-26 RX ORDER — BETAMETHASONE SODIUM PHOSPHATE AND BETAMETHASONE ACETATE 3; 3 MG/ML; MG/ML
6 INJECTION, SUSPENSION INTRA-ARTICULAR; INTRALESIONAL; INTRAMUSCULAR; SOFT TISSUE
Status: DISCONTINUED | OUTPATIENT
Start: 2024-12-26 | End: 2024-12-26

## 2024-12-26 NOTE — TELEPHONE ENCOUNTER
----- Message from India Ahmadi, BRITTANYP-C sent at 12/26/2024  3:08 PM CST -----  Ultrasound of the neck was negative.  No mass no enlarged lymph nodes, nothing.  I do not feel like that makes a lot of sense so I am going to suggest that we continue with our plan and have her take the antibiotic that I sent out.  We will follow-up next week, I did put for heard a come in and see me but I am not sure if she made an appointment before leaving today.  At that point if the tenderness and pain has resolved are diminished we can try cutting that skin tag off and sending it for biopsy.

## 2024-12-26 NOTE — TELEPHONE ENCOUNTER
Please call patient and schedule with rony for next week, I tried to transfer the call but it did not work

## 2024-12-26 NOTE — ASSESSMENT & PLAN NOTE
Concerned for development of peritonsillar abscess.  We will draw CBC, BNP and do a rapid strep today.    She will go to the hospital Following this visit for an ultrasound soft tissue neck.  Start Augmentin q.12 x 10 days   Follow up Monday for recheck

## 2024-12-26 NOTE — PROGRESS NOTES
"Patient ID: Samanta Pastrana  : 1970    Chief Complaint: Mass    Allergies: Patient has No Known Allergies.     History of Present Illness:  The patient is a 54 y.o. White female who presents to clinic for follow up on Mass     Patient of Rozina Perez's; complains of "boil" on right side of neck. First noticed about 4 weeks ago, it has grown in size, it "stings", itches and now having difficulty with swallowing; "I feel like theres a pressure inside my neck." She also notes dry, irritated cough and voice changes. She denies fever, chills.     Social History:  reports that she has been smoking cigarettes. She started smoking about 38 years ago. She has a 19 pack-year smoking history. She has been exposed to tobacco smoke. She has never used smokeless tobacco. She reports that she does not currently use alcohol. She reports that she does not use drugs.    Past Medical History:  has a past medical history of Allergies, Anxiety, Arthritis, Bilateral hand pain, Cancer, Carpal tunnel syndrome, Cervical neuropathy, Chronic pain, COVID-19, DDD (degenerative disc disease), cervical, DDD (degenerative disc disease), lumbar, Depression, DUB (dysfunctional uterine bleeding), Fibromyalgia, GERD (gastroesophageal reflux disease), History of lobectomy of thyroid, Hyperlipidemia, Insomnia, Joint pain, Mental disorder, Obesity, unspecified, Renal cyst, Right ureteral stone, Tobacco user, and Vitamin D deficiency.    Current Medications:  Current Outpatient Medications   Medication Instructions    albuterol (PROVENTIL/VENTOLIN HFA) 90 mcg/actuation inhaler 2 puffs, Inhalation, Every 6 hours PRN    amoxicillin-clavulanate 875-125mg (AUGMENTIN) 875-125 mg per tablet 1 tablet, Oral, Every 12 hours    atorvastatin (LIPITOR) 20 mg, Oral    citalopram (CELEXA) 40 mg, Oral, Daily    metoprolol succinate (TOPROL-XL) 25 mg, Oral, Daily    mirtazapine (REMERON) 15 mg, Oral, Nightly    naproxen (NAPROSYN) 500 mg, Oral, 2 times daily with " "meals    omeprazole (PRILOSEC) 40 mg, Oral    VITAMIN D2 50,000 Units, Oral, Every 7 days       Review of Systems   See HPI    Visit Vitals  /70 (BP Location: Left arm, Patient Position: Sitting)   Pulse 89   Temp 97.2 °F (36.2 °C) (Temporal)   Ht 5' 4.02" (1.626 m)   Wt 81.8 kg (180 lb 6.4 oz)   LMP  (LMP Unknown)   SpO2 98%   BMI 30.95 kg/m²       Physical Exam  Vitals reviewed.   Constitutional:       Appearance: Normal appearance.   HENT:      Mouth/Throat:      Pharynx: Posterior oropharyngeal erythema present. No oropharyngeal exudate.   Cardiovascular:      Heart sounds: Normal heart sounds.   Pulmonary:      Breath sounds: Normal breath sounds.   Musculoskeletal:      Cervical back: Tenderness (right side neck tenderness without feeling lymph enlargement) present.   Skin:     General: Skin is warm and dry.   Neurological:      Mental Status: She is oriented to person, place, and time.            Assessment & Plan:  1. Localized swelling, mass and lump, neck  Assessment & Plan:  Concerned for development of peritonsillar abscess.  We will draw CBC, BNP and do a rapid strep today.    She will go to the hospital Following this visit for an ultrasound soft tissue neck.  Start Augmentin q.12 x 10 days   Follow up Monday for recheck    Orders:  -     Cancel: X-Ray Neck Soft Tissue; Future; Expected date: 12/26/2024  -     CBC Auto Differential; Future; Expected date: 12/26/2024  -     Basic Metabolic Panel; Future; Expected date: 12/26/2024  -     POCT Rapid Strep A  -     US Soft Tissue Head Neck; Future; Expected date: 12/26/2024  -     amoxicillin-clavulanate 875-125mg (AUGMENTIN) 875-125 mg per tablet; Take 1 tablet by mouth every 12 (twelve) hours. for 10 days  Dispense: 20 tablet; Refill: 0         Future Appointments   Date Time Provider Department Center   1/27/2025  1:45 PM Rozina Perez FNP-C [facility]       Follow up fu MOnday with me. Call sooner if needed.    India Ahmadi, " FNP-C    Lab Frequency Next Occurrence   Mammo Digital Screening Bilat w/ Shay Once 10/09/2023   CT Renal Stone Study ABD Pelvis WO Once 03/19/2024   Ambulatory referral/consult to Physical/Occupational Therapy Once 04/17/2024   Ambulatory referral/consult to Orthopedics Once 04/17/2024

## 2025-01-02 ENCOUNTER — OFFICE VISIT (OUTPATIENT)
Dept: FAMILY MEDICINE | Facility: CLINIC | Age: 55
End: 2025-01-02
Payer: COMMERCIAL

## 2025-01-02 VITALS
HEART RATE: 86 BPM | DIASTOLIC BLOOD PRESSURE: 86 MMHG | WEIGHT: 183 LBS | SYSTOLIC BLOOD PRESSURE: 120 MMHG | BODY MASS INDEX: 31.24 KG/M2 | TEMPERATURE: 97 F | HEIGHT: 64 IN | OXYGEN SATURATION: 97 %

## 2025-01-02 DIAGNOSIS — D48.9 NEOPLASM OF UNCERTAIN BEHAVIOR: Primary | ICD-10-CM

## 2025-01-02 NOTE — PROGRESS NOTES
Patient ID: Samanta Pastrana  : 1970    Chief Complaint: Mass (Follow up )    Allergies: Patient has No Known Allergies.     History of Present Illness:  The patient is a 54 y.o. White female who presents to clinic for follow up on Mass (Follow up )     She has a rapidly growing skin lesion on the right side of her neck.  Was tender last week but now the tenderness has resolved.    Social History:  reports that she has been smoking cigarettes. She started smoking about 38 years ago. She has a 19 pack-year smoking history. She has been exposed to tobacco smoke. She has never used smokeless tobacco. She reports that she does not currently use alcohol. She reports that she does not use drugs.    Past Medical History:  has a past medical history of Allergies, Anxiety, Arthritis, Bilateral hand pain, Cancer, Carpal tunnel syndrome, Cervical neuropathy, Chronic pain, COVID-19, DDD (degenerative disc disease), cervical, DDD (degenerative disc disease), lumbar, Depression, DUB (dysfunctional uterine bleeding), Fibromyalgia, GERD (gastroesophageal reflux disease), History of lobectomy of thyroid, Hyperlipidemia, Insomnia, Joint pain, Mental disorder, Obesity, unspecified, Renal cyst, Right ureteral stone, Tobacco user, and Vitamin D deficiency.    Current Medications:  Current Outpatient Medications   Medication Instructions    albuterol (PROVENTIL/VENTOLIN HFA) 90 mcg/actuation inhaler 2 puffs, Inhalation, Every 6 hours PRN    amoxicillin-clavulanate 875-125mg (AUGMENTIN) 875-125 mg per tablet 1 tablet, Oral, Every 12 hours    atorvastatin (LIPITOR) 20 mg, Oral    citalopram (CELEXA) 40 mg, Oral, Daily    metoprolol succinate (TOPROL-XL) 25 mg, Oral, Daily    mirtazapine (REMERON) 15 mg, Oral, Nightly    naproxen (NAPROSYN) 500 mg, Oral, 2 times daily with meals    omeprazole (PRILOSEC) 40 mg, Oral    VITAMIN D2 50,000 Units, Oral, Every 7 days       Review of Systems   See HPI    Visit Vitals  /86 (BP  "Location: Left arm, Patient Position: Sitting)   Pulse 86   Temp 97.2 °F (36.2 °C) (Temporal)   Ht 5' 4.02" (1.626 m)   Wt 83 kg (183 lb)   LMP  (LMP Unknown)   SpO2 97%   BMI 31.40 kg/m²       Physical Exam  Vitals reviewed.   Constitutional:       Appearance: Normal appearance.   Skin:     General: Skin is warm and dry.      Comments: Small skin tag lesion to the right side of neck          Labs Reviewed:  Chemistry:  Lab Results   Component Value Date     12/26/2024    K 4.4 12/26/2024    BUN 7 12/26/2024    CREATININE 0.80 12/26/2024    EGFRNORACEVR 88 12/26/2024    GLUCOSE 92 12/26/2024    CALCIUM 9.8 12/26/2024    ALKPHOS 68 10/04/2024    LABPROT 6.3 10/04/2024    ALBUMIN 4.2 10/04/2024    BILIDIR 0.00 11/23/2019    IBILI 0.20 11/23/2019    AST 27 10/04/2024    ALT 27 10/04/2024    [id number] 37 10/04/2024    TSH 3.410 10/04/2024    LCXTDB6AXLN 1.12 08/16/2023        Hematology:  Lab Results   Component Value Date    WBC 7.98 12/26/2024    RBC 4.41 12/26/2024    HGB 13.5 12/26/2024    HCT 38.9 12/26/2024    MCV 88.2 12/26/2024    MCH 30.6 12/26/2024    MCHC 34.7 12/26/2024    RDW 12.3 12/26/2024     (H) 12/26/2024    MPV 9.4 12/26/2024         Assessment & Plan:  1. Neoplasm of uncertain behavior  -     Excision of Lesion  -     Specimen to Pathology Dermatology and skin neoplasms         Future Appointments   Date Time Provider Department Center   1/27/2025  1:45 PM Rozina Perez FNP-[facility]       Follow up if symptoms worsen or fail to improve. Call sooner if needed.    DAQUAN Conteh    Lab Frequency Next Occurrence   Mammo Digital Screening Bilat w/ Shay Once 10/09/2023   CT Renal Stone Study ABD Pelvis WO Once 03/19/2024   Ambulatory referral/consult to Physical/Occupational Therapy Once 04/17/2024   Ambulatory referral/consult to Orthopedics Once 04/17/2024            "

## 2025-01-02 NOTE — PROCEDURES
Excision of Lesion    Date/Time: 1/2/2025 3:45 PM    Performed by: India Ahmadi FNP-C  Authorized by: India Ahmadi FNP-C    Consent Done?:  Yes (Verbal)  Local anesthesia used?: Yes    Anesthesia:  Local infiltration  Local anesthetic:  Lidocaine 1% with epinephrine  Anesthetic total (ml):  1  Assistants?: Yes    List of assistants:  Gauri Franklin I was present for the entire procedure.  : skin growth; neoplasm of uncertain behavior.  Body area:  Neck / anterior thorax  Laterality:  Right  Position:  Supine  Anesthesia:  Local infiltration  Local anesthetic:  Lidocaine 1% with epinephrine  Excision type:  Skin  Malignancy:  Benign  Excision size (cm):  1  Scalpel size: sterile straight scissors.   Hemostasis was obtained.  Estimated blood loss (cc):  0.3  Wound closure: cautery.  Sterile dressings:  Other (comments) (bandaid)  Post-op diagnosis:  Same as pre-op diagnosis.   Patient tolerated the procedure well with no immediate complications.   Patient was discharged and will follow up for wound check.

## 2025-01-08 ENCOUNTER — TELEPHONE (OUTPATIENT)
Dept: FAMILY MEDICINE | Facility: CLINIC | Age: 55
End: 2025-01-08
Payer: COMMERCIAL

## 2025-01-08 DIAGNOSIS — D48.9 NEOPLASM OF UNCERTAIN BEHAVIOR: Primary | ICD-10-CM

## 2025-01-08 NOTE — TELEPHONE ENCOUNTER
----- Message from MARCIAL Yost-WHIT sent at 1/8/2025  4:48 PM CST -----  The lesion that was removed from her neck showed some atypical cells and requires further examination.  I am going to refer her to Dermatology in [place].  [facility] should call her to schedule an appointment.

## 2025-01-09 ENCOUNTER — PATIENT MESSAGE (OUTPATIENT)
Dept: FAMILY MEDICINE | Facility: CLINIC | Age: 55
End: 2025-01-09
Payer: COMMERCIAL

## 2025-01-27 ENCOUNTER — TELEPHONE (OUTPATIENT)
Dept: FAMILY MEDICINE | Facility: CLINIC | Age: 55
End: 2025-01-27

## 2025-04-21 DIAGNOSIS — G47.00 INSOMNIA, UNSPECIFIED TYPE: ICD-10-CM

## 2025-04-21 RX ORDER — MIRTAZAPINE 15 MG/1
15 TABLET, FILM COATED ORAL NIGHTLY
Qty: 30 TABLET | Refills: 0 | Status: SHIPPED | OUTPATIENT
Start: 2025-04-21

## 2025-04-23 ENCOUNTER — HOSPITAL ENCOUNTER (OUTPATIENT)
Dept: RADIOLOGY | Facility: HOSPITAL | Age: 55
Discharge: HOME | End: 2025-04-23
Attending: PHYSICAL MEDICINE & REHABILITATION
Payer: COMMERCIAL

## 2025-04-23 DIAGNOSIS — R52 PAIN: ICD-10-CM

## 2025-04-23 PROCEDURE — 72146 MRI CHEST SPINE W/O DYE: CPT | Mod: TC

## 2025-04-23 PROCEDURE — 72148 MRI LUMBAR SPINE W/O DYE: CPT | Mod: TC

## 2025-05-19 DIAGNOSIS — G47.00 INSOMNIA, UNSPECIFIED TYPE: ICD-10-CM

## 2025-05-19 RX ORDER — MIRTAZAPINE 15 MG/1
15 TABLET, FILM COATED ORAL NIGHTLY
Qty: 30 TABLET | Refills: 0 | OUTPATIENT
Start: 2025-05-19

## 2025-05-19 NOTE — TELEPHONE ENCOUNTER
Refill refused because patient has not been seen in close to a year (7/25/24).  Patient needs appointment scheduled before more refills per Rozina.  Looks like patient maybe seeing.Dr Solis.

## 2025-05-29 DIAGNOSIS — G47.00 INSOMNIA, UNSPECIFIED TYPE: ICD-10-CM

## 2025-05-29 RX ORDER — MIRTAZAPINE 15 MG/1
15 TABLET, FILM COATED ORAL NIGHTLY
Qty: 30 TABLET | Refills: 0 | Status: SHIPPED | OUTPATIENT
Start: 2025-05-29

## 2025-06-06 DIAGNOSIS — E55.9 VITAMIN D DEFICIENCY: ICD-10-CM

## 2025-06-06 RX ORDER — ERGOCALCIFEROL 1.25 MG/1
50000 CAPSULE ORAL
Qty: 5 CAPSULE | Refills: 6 | OUTPATIENT
Start: 2025-06-06

## 2025-06-11 ENCOUNTER — TELEPHONE (OUTPATIENT)
Dept: FAMILY MEDICINE | Facility: CLINIC | Age: 55
End: 2025-06-11
Payer: COMMERCIAL

## 2025-06-27 ENCOUNTER — HOSPITAL ENCOUNTER (OUTPATIENT)
Dept: RADIOLOGY | Facility: HOSPITAL | Age: 55
Discharge: HOME | End: 2025-06-27
Attending: PHYSICAL MEDICINE & REHABILITATION
Payer: COMMERCIAL

## 2025-06-27 DIAGNOSIS — M48.02 SPINAL STENOSIS IN CERVICAL REGION: ICD-10-CM

## 2025-06-27 PROCEDURE — 72141 MRI NECK SPINE W/O DYE: CPT | Mod: TC

## 2025-07-09 ENCOUNTER — TELEPHONE (OUTPATIENT)
Dept: FAMILY MEDICINE | Facility: CLINIC | Age: 55
End: 2025-07-09
Payer: COMMERCIAL

## 2025-07-09 NOTE — TELEPHONE ENCOUNTER
Tried to call patient to see if she was still being see here because it looks like see is seeing Will. Patient did not answer.  She has an appointment coming up.

## 2025-07-14 DIAGNOSIS — I10 HYPERTENSION, UNSPECIFIED TYPE: ICD-10-CM

## 2025-07-14 DIAGNOSIS — K21.9 GASTROESOPHAGEAL REFLUX DISEASE, UNSPECIFIED WHETHER ESOPHAGITIS PRESENT: ICD-10-CM

## 2025-07-14 RX ORDER — METOPROLOL SUCCINATE 25 MG/1
25 TABLET, EXTENDED RELEASE ORAL
Qty: 90 TABLET | Refills: 0 | OUTPATIENT
Start: 2025-07-14

## 2025-07-14 RX ORDER — OMEPRAZOLE 40 MG/1
40 CAPSULE, DELAYED RELEASE ORAL
Qty: 30 CAPSULE | Refills: 0 | OUTPATIENT
Start: 2025-07-14

## 2025-07-14 NOTE — TELEPHONE ENCOUNTER
Tried to call patient, to see if they are still coming here and explain need for appointment to get refills, no answer.

## 2025-07-29 ENCOUNTER — TELEPHONE (OUTPATIENT)
Dept: FAMILY MEDICINE | Facility: CLINIC | Age: 55
End: 2025-07-29

## 2025-08-11 ENCOUNTER — PATIENT MESSAGE (OUTPATIENT)
Facility: CLINIC | Age: 55
End: 2025-08-11
Payer: COMMERCIAL

## 2025-08-11 DIAGNOSIS — F41.9 ANXIETY: ICD-10-CM

## 2025-08-11 RX ORDER — CITALOPRAM 40 MG/1
40 TABLET ORAL
Qty: 90 TABLET | Refills: 0 | OUTPATIENT
Start: 2025-08-11

## 2025-08-18 DIAGNOSIS — I10 HYPERTENSION, UNSPECIFIED TYPE: ICD-10-CM

## 2025-08-18 DIAGNOSIS — F41.9 ANXIETY: ICD-10-CM

## 2025-08-18 RX ORDER — CITALOPRAM 40 MG/1
40 TABLET ORAL
Qty: 90 TABLET | Refills: 0 | OUTPATIENT
Start: 2025-08-18

## 2025-08-18 RX ORDER — METOPROLOL SUCCINATE 25 MG/1
25 TABLET, EXTENDED RELEASE ORAL
Qty: 90 TABLET | Refills: 0 | OUTPATIENT
Start: 2025-08-18